# Patient Record
Sex: MALE | Race: WHITE | Employment: OTHER | URBAN - METROPOLITAN AREA
[De-identification: names, ages, dates, MRNs, and addresses within clinical notes are randomized per-mention and may not be internally consistent; named-entity substitution may affect disease eponyms.]

---

## 2021-01-01 ENCOUNTER — APPOINTMENT (OUTPATIENT)
Dept: CT IMAGING | Age: 81
DRG: 871 | End: 2021-01-01
Payer: MEDICARE

## 2021-01-01 ENCOUNTER — APPOINTMENT (OUTPATIENT)
Dept: GENERAL RADIOLOGY | Age: 81
DRG: 871 | End: 2021-01-01
Payer: MEDICARE

## 2021-01-01 ENCOUNTER — APPOINTMENT (OUTPATIENT)
Dept: ULTRASOUND IMAGING | Age: 81
DRG: 871 | End: 2021-01-01
Payer: MEDICARE

## 2021-01-01 ENCOUNTER — HOSPITAL ENCOUNTER (INPATIENT)
Age: 81
LOS: 5 days | Discharge: HOSPICE | DRG: 871 | End: 2021-09-25
Attending: EMERGENCY MEDICINE | Admitting: INTERNAL MEDICINE
Payer: MEDICARE

## 2021-01-01 VITALS
OXYGEN SATURATION: 95 % | HEIGHT: 66 IN | WEIGHT: 219.1 LBS | BODY MASS INDEX: 35.21 KG/M2 | HEART RATE: 89 BPM | TEMPERATURE: 99.3 F | DIASTOLIC BLOOD PRESSURE: 70 MMHG | SYSTOLIC BLOOD PRESSURE: 155 MMHG | RESPIRATION RATE: 24 BRPM

## 2021-01-01 DIAGNOSIS — J12.82 PNEUMONIA DUE TO COVID-19 VIRUS: Primary | ICD-10-CM

## 2021-01-01 DIAGNOSIS — U07.1 PNEUMONIA DUE TO COVID-19 VIRUS: Primary | ICD-10-CM

## 2021-01-01 DIAGNOSIS — J96.01 ACUTE RESPIRATORY FAILURE WITH HYPOXIA (HCC): ICD-10-CM

## 2021-01-01 DIAGNOSIS — I26.99 BILATERAL PULMONARY EMBOLISM (HCC): ICD-10-CM

## 2021-01-01 LAB
ACANTHOCYTES: ABNORMAL
ALBUMIN SERPL-MCNC: 2.7 G/DL (ref 3.5–5.2)
ALBUMIN SERPL-MCNC: 2.9 G/DL (ref 3.5–5.2)
ALBUMIN SERPL-MCNC: 2.9 G/DL (ref 3.5–5.2)
ALBUMIN SERPL-MCNC: 3.1 G/DL (ref 3.5–5.2)
ALBUMIN SERPL-MCNC: 3.3 G/DL (ref 3.5–5.2)
ALBUMIN SERPL-MCNC: 3.5 G/DL (ref 3.5–5.2)
ALP BLD-CCNC: 56 U/L (ref 40–129)
ALP BLD-CCNC: 58 U/L (ref 40–129)
ALP BLD-CCNC: 59 U/L (ref 40–129)
ALP BLD-CCNC: 64 U/L (ref 40–129)
ALP BLD-CCNC: 66 U/L (ref 40–129)
ALP BLD-CCNC: 70 U/L (ref 40–129)
ALT SERPL-CCNC: 28 U/L (ref 0–40)
ALT SERPL-CCNC: 31 U/L (ref 0–40)
ALT SERPL-CCNC: 33 U/L (ref 0–40)
ALT SERPL-CCNC: 36 U/L (ref 0–40)
ALT SERPL-CCNC: 37 U/L (ref 0–40)
ALT SERPL-CCNC: 45 U/L (ref 0–40)
ANION GAP SERPL CALCULATED.3IONS-SCNC: 10 MMOL/L (ref 7–16)
ANION GAP SERPL CALCULATED.3IONS-SCNC: 11 MMOL/L (ref 7–16)
ANION GAP SERPL CALCULATED.3IONS-SCNC: 13 MMOL/L (ref 7–16)
ANION GAP SERPL CALCULATED.3IONS-SCNC: 8 MMOL/L (ref 7–16)
APTT: >240 SEC (ref 24.5–35.1)
AST SERPL-CCNC: 33 U/L (ref 0–39)
AST SERPL-CCNC: 35 U/L (ref 0–39)
AST SERPL-CCNC: 40 U/L (ref 0–39)
AST SERPL-CCNC: 44 U/L (ref 0–39)
AST SERPL-CCNC: 51 U/L (ref 0–39)
AST SERPL-CCNC: 68 U/L (ref 0–39)
B.E.: -1.6 MMOL/L (ref -3–3)
BASOPHILS ABSOLUTE: 0 E9/L (ref 0–0.2)
BASOPHILS ABSOLUTE: 0.01 E9/L (ref 0–0.2)
BASOPHILS ABSOLUTE: 0.01 E9/L (ref 0–0.2)
BASOPHILS RELATIVE PERCENT: 0 % (ref 0–2)
BASOPHILS RELATIVE PERCENT: 0.2 % (ref 0–2)
BASOPHILS RELATIVE PERCENT: 0.2 % (ref 0–2)
BILIRUB SERPL-MCNC: 0.5 MG/DL (ref 0–1.2)
BILIRUB SERPL-MCNC: 0.5 MG/DL (ref 0–1.2)
BILIRUB SERPL-MCNC: 0.6 MG/DL (ref 0–1.2)
BILIRUB SERPL-MCNC: 0.6 MG/DL (ref 0–1.2)
BILIRUB SERPL-MCNC: 0.8 MG/DL (ref 0–1.2)
BILIRUB SERPL-MCNC: 0.9 MG/DL (ref 0–1.2)
BILIRUBIN DIRECT: 0.2 MG/DL (ref 0–0.3)
BILIRUBIN, INDIRECT: 0.3 MG/DL (ref 0–1)
BLOOD CULTURE, ROUTINE: NORMAL
BUN BLDV-MCNC: 18 MG/DL (ref 6–23)
BUN BLDV-MCNC: 19 MG/DL (ref 6–23)
BUN BLDV-MCNC: 21 MG/DL (ref 6–23)
BUN BLDV-MCNC: 21 MG/DL (ref 6–23)
BUN BLDV-MCNC: 26 MG/DL (ref 6–23)
BUN BLDV-MCNC: 33 MG/DL (ref 6–23)
BURR CELLS: ABNORMAL
C-REACTIVE PROTEIN: 1.8 MG/DL (ref 0–0.4)
C-REACTIVE PROTEIN: 2.1 MG/DL (ref 0–0.4)
C-REACTIVE PROTEIN: 2.4 MG/DL (ref 0–0.4)
C-REACTIVE PROTEIN: 2.4 MG/DL (ref 0–0.4)
C-REACTIVE PROTEIN: 3.8 MG/DL (ref 0–0.4)
CALCIUM SERPL-MCNC: 7.5 MG/DL (ref 8.6–10.2)
CALCIUM SERPL-MCNC: 7.5 MG/DL (ref 8.6–10.2)
CALCIUM SERPL-MCNC: 7.7 MG/DL (ref 8.6–10.2)
CALCIUM SERPL-MCNC: 7.7 MG/DL (ref 8.6–10.2)
CALCIUM SERPL-MCNC: 8.1 MG/DL (ref 8.6–10.2)
CALCIUM SERPL-MCNC: 8.4 MG/DL (ref 8.6–10.2)
CHLORIDE BLD-SCNC: 102 MMOL/L (ref 98–107)
CHLORIDE BLD-SCNC: 106 MMOL/L (ref 98–107)
CHLORIDE BLD-SCNC: 106 MMOL/L (ref 98–107)
CHLORIDE BLD-SCNC: 107 MMOL/L (ref 98–107)
CHLORIDE BLD-SCNC: 109 MMOL/L (ref 98–107)
CHLORIDE BLD-SCNC: 109 MMOL/L (ref 98–107)
CO2: 19 MMOL/L (ref 22–29)
CO2: 20 MMOL/L (ref 22–29)
CO2: 21 MMOL/L (ref 22–29)
CO2: 22 MMOL/L (ref 22–29)
CO2: 23 MMOL/L (ref 22–29)
CO2: 24 MMOL/L (ref 22–29)
COHB: 0.6 % (ref 0–1.5)
CREAT SERPL-MCNC: 0.8 MG/DL (ref 0.7–1.2)
CREAT SERPL-MCNC: 0.9 MG/DL (ref 0.7–1.2)
CREAT SERPL-MCNC: 1.1 MG/DL (ref 0.7–1.2)
CRITICAL: ABNORMAL
CULTURE, BLOOD 2: NORMAL
D DIMER: 1516 NG/ML DDU
D DIMER: 1612 NG/ML DDU
D DIMER: 1650 NG/ML DDU
D DIMER: 1958 NG/ML DDU
D DIMER: 865 NG/ML DDU
DATE ANALYZED: ABNORMAL
DATE OF COLLECTION: ABNORMAL
EKG ATRIAL RATE: 90 BPM
EKG P AXIS: 65 DEGREES
EKG P-R INTERVAL: 132 MS
EKG Q-T INTERVAL: 398 MS
EKG QRS DURATION: 98 MS
EKG QTC CALCULATION (BAZETT): 486 MS
EKG R AXIS: 16 DEGREES
EKG T AXIS: -16 DEGREES
EKG VENTRICULAR RATE: 90 BPM
EOSINOPHILS ABSOLUTE: 0 E9/L (ref 0.05–0.5)
EOSINOPHILS RELATIVE PERCENT: 0 % (ref 0–6)
FIBRINOGEN: 385 MG/DL (ref 225–540)
FIBRINOGEN: 398 MG/DL (ref 225–540)
FIBRINOGEN: 432 MG/DL (ref 225–540)
FIBRINOGEN: 473 MG/DL (ref 225–540)
FIBRINOGEN: 473 MG/DL (ref 225–540)
FIBRINOGEN: 544 MG/DL (ref 225–540)
GFR AFRICAN AMERICAN: >60
GFR NON-AFRICAN AMERICAN: >60 ML/MIN/1.73
GLUCOSE BLD-MCNC: 159 MG/DL (ref 74–99)
GLUCOSE BLD-MCNC: 170 MG/DL (ref 74–99)
GLUCOSE BLD-MCNC: 187 MG/DL (ref 74–99)
GLUCOSE BLD-MCNC: 195 MG/DL (ref 74–99)
GLUCOSE BLD-MCNC: 195 MG/DL (ref 74–99)
GLUCOSE BLD-MCNC: 203 MG/DL (ref 74–99)
HCO3: 20.4 MMOL/L (ref 22–26)
HCT VFR BLD CALC: 38.1 % (ref 37–54)
HCT VFR BLD CALC: 38.7 % (ref 37–54)
HCT VFR BLD CALC: 39.2 % (ref 37–54)
HCT VFR BLD CALC: 39.7 % (ref 37–54)
HCT VFR BLD CALC: 41.5 % (ref 37–54)
HCT VFR BLD CALC: 43.4 % (ref 37–54)
HCT VFR BLD CALC: 44 % (ref 37–54)
HEMOGLOBIN: 12.6 G/DL (ref 12.5–16.5)
HEMOGLOBIN: 12.6 G/DL (ref 12.5–16.5)
HEMOGLOBIN: 13 G/DL (ref 12.5–16.5)
HEMOGLOBIN: 13.1 G/DL (ref 12.5–16.5)
HEMOGLOBIN: 13.7 G/DL (ref 12.5–16.5)
HEMOGLOBIN: 14.2 G/DL (ref 12.5–16.5)
HEMOGLOBIN: 14.3 G/DL (ref 12.5–16.5)
HHB: 5.9 % (ref 0–5)
IMMATURE GRANULOCYTES #: 0.02 E9/L
IMMATURE GRANULOCYTES #: 0.03 E9/L
IMMATURE GRANULOCYTES #: 0.03 E9/L
IMMATURE GRANULOCYTES #: 0.04 E9/L
IMMATURE GRANULOCYTES #: 0.05 E9/L
IMMATURE GRANULOCYTES #: 0.08 E9/L
IMMATURE GRANULOCYTES %: 0.3 % (ref 0–5)
IMMATURE GRANULOCYTES %: 0.5 % (ref 0–5)
IMMATURE GRANULOCYTES %: 0.6 % (ref 0–5)
IMMATURE GRANULOCYTES %: 0.8 % (ref 0–5)
IMMATURE GRANULOCYTES %: 0.9 % (ref 0–5)
IMMATURE GRANULOCYTES %: 1.2 % (ref 0–5)
INR BLD: 1.2
INR BLD: 1.2
INR BLD: 1.6
INR BLD: 1.7
INR BLD: 1.8
INR BLD: 2
LAB: ABNORMAL
LACTIC ACID, SEPSIS: 2.1 MMOL/L (ref 0.5–1.9)
LACTIC ACID: 2.2 MMOL/L (ref 0.5–2.2)
LYMPHOCYTES ABSOLUTE: 0.5 E9/L (ref 1.5–4)
LYMPHOCYTES ABSOLUTE: 0.53 E9/L (ref 1.5–4)
LYMPHOCYTES ABSOLUTE: 0.57 E9/L (ref 1.5–4)
LYMPHOCYTES ABSOLUTE: 0.65 E9/L (ref 1.5–4)
LYMPHOCYTES ABSOLUTE: 0.71 E9/L (ref 1.5–4)
LYMPHOCYTES ABSOLUTE: 0.71 E9/L (ref 1.5–4)
LYMPHOCYTES RELATIVE PERCENT: 11 % (ref 20–42)
LYMPHOCYTES RELATIVE PERCENT: 11.6 % (ref 20–42)
LYMPHOCYTES RELATIVE PERCENT: 13.1 % (ref 20–42)
LYMPHOCYTES RELATIVE PERCENT: 13.3 % (ref 20–42)
LYMPHOCYTES RELATIVE PERCENT: 7.6 % (ref 20–42)
LYMPHOCYTES RELATIVE PERCENT: 9.2 % (ref 20–42)
Lab: ABNORMAL
MCH RBC QN AUTO: 30 PG (ref 26–35)
MCH RBC QN AUTO: 30 PG (ref 26–35)
MCH RBC QN AUTO: 30.3 PG (ref 26–35)
MCH RBC QN AUTO: 30.4 PG (ref 26–35)
MCH RBC QN AUTO: 30.6 PG (ref 26–35)
MCHC RBC AUTO-ENTMCNC: 32.3 % (ref 32–34.5)
MCHC RBC AUTO-ENTMCNC: 32.6 % (ref 32–34.5)
MCHC RBC AUTO-ENTMCNC: 32.9 % (ref 32–34.5)
MCHC RBC AUTO-ENTMCNC: 33 % (ref 32–34.5)
MCHC RBC AUTO-ENTMCNC: 33 % (ref 32–34.5)
MCHC RBC AUTO-ENTMCNC: 33.1 % (ref 32–34.5)
MCHC RBC AUTO-ENTMCNC: 33.2 % (ref 32–34.5)
MCV RBC AUTO: 91 FL (ref 80–99.9)
MCV RBC AUTO: 91.6 FL (ref 80–99.9)
MCV RBC AUTO: 92.5 FL (ref 80–99.9)
MCV RBC AUTO: 92.8 FL (ref 80–99.9)
MCV RBC AUTO: 92.8 FL (ref 80–99.9)
MCV RBC AUTO: 92.9 FL (ref 80–99.9)
MCV RBC AUTO: 93 FL (ref 80–99.9)
METER GLUCOSE: 121 MG/DL (ref 74–99)
METER GLUCOSE: 136 MG/DL (ref 74–99)
METER GLUCOSE: 149 MG/DL (ref 74–99)
METER GLUCOSE: 173 MG/DL (ref 74–99)
METER GLUCOSE: 175 MG/DL (ref 74–99)
METER GLUCOSE: 182 MG/DL (ref 74–99)
METER GLUCOSE: 187 MG/DL (ref 74–99)
METER GLUCOSE: 222 MG/DL (ref 74–99)
METHB: 0.1 % (ref 0–1.5)
MODE: ABNORMAL
MONOCYTES ABSOLUTE: 0.2 E9/L (ref 0.1–0.95)
MONOCYTES ABSOLUTE: 0.23 E9/L (ref 0.1–0.95)
MONOCYTES ABSOLUTE: 0.24 E9/L (ref 0.1–0.95)
MONOCYTES ABSOLUTE: 0.26 E9/L (ref 0.1–0.95)
MONOCYTES ABSOLUTE: 0.32 E9/L (ref 0.1–0.95)
MONOCYTES ABSOLUTE: 0.33 E9/L (ref 0.1–0.95)
MONOCYTES RELATIVE PERCENT: 3.5 % (ref 2–12)
MONOCYTES RELATIVE PERCENT: 3.8 % (ref 2–12)
MONOCYTES RELATIVE PERCENT: 4.4 % (ref 2–12)
MONOCYTES RELATIVE PERCENT: 5.2 % (ref 2–12)
MONOCYTES RELATIVE PERCENT: 5.3 % (ref 2–12)
MONOCYTES RELATIVE PERCENT: 5.7 % (ref 2–12)
NEUTROPHILS ABSOLUTE: 3.92 E9/L (ref 1.8–7.3)
NEUTROPHILS ABSOLUTE: 4.4 E9/L (ref 1.8–7.3)
NEUTROPHILS ABSOLUTE: 4.41 E9/L (ref 1.8–7.3)
NEUTROPHILS ABSOLUTE: 4.86 E9/L (ref 1.8–7.3)
NEUTROPHILS ABSOLUTE: 5.06 E9/L (ref 1.8–7.3)
NEUTROPHILS ABSOLUTE: 5.78 E9/L (ref 1.8–7.3)
NEUTROPHILS RELATIVE PERCENT: 80.6 % (ref 43–80)
NEUTROPHILS RELATIVE PERCENT: 81.6 % (ref 43–80)
NEUTROPHILS RELATIVE PERCENT: 82.7 % (ref 43–80)
NEUTROPHILS RELATIVE PERCENT: 84.6 % (ref 43–80)
NEUTROPHILS RELATIVE PERCENT: 84.6 % (ref 43–80)
NEUTROPHILS RELATIVE PERCENT: 87.5 % (ref 43–80)
O2 CONTENT: 17.6 ML/DL
O2 SATURATION: 94.1 % (ref 92–98.5)
O2HB: 93.4 % (ref 94–97)
OPERATOR ID: 7296
OVALOCYTES: ABNORMAL
OVALOCYTES: ABNORMAL
PATIENT TEMP: 37 C
PCO2: 27.3 MMHG (ref 35–45)
PDW BLD-RTO: 14.4 FL (ref 11.5–15)
PDW BLD-RTO: 14.4 FL (ref 11.5–15)
PDW BLD-RTO: 14.6 FL (ref 11.5–15)
PDW BLD-RTO: 14.8 FL (ref 11.5–15)
PDW BLD-RTO: 15 FL (ref 11.5–15)
PH BLOOD GAS: 7.49 (ref 7.35–7.45)
PLATELET # BLD: 105 E9/L (ref 130–450)
PLATELET # BLD: 107 E9/L (ref 130–450)
PLATELET # BLD: 113 E9/L (ref 130–450)
PLATELET # BLD: 118 E9/L (ref 130–450)
PLATELET # BLD: 134 E9/L (ref 130–450)
PLATELET # BLD: 152 E9/L (ref 130–450)
PLATELET # BLD: 178 E9/L (ref 130–450)
PMV BLD AUTO: 10.4 FL (ref 7–12)
PMV BLD AUTO: 10.5 FL (ref 7–12)
PMV BLD AUTO: 10.5 FL (ref 7–12)
PMV BLD AUTO: 10.7 FL (ref 7–12)
PMV BLD AUTO: 9.4 FL (ref 7–12)
PMV BLD AUTO: 9.6 FL (ref 7–12)
PMV BLD AUTO: 9.7 FL (ref 7–12)
PO2: 68.1 MMHG (ref 75–100)
POIKILOCYTES: ABNORMAL
POLYCHROMASIA: ABNORMAL
POTASSIUM REFLEX MAGNESIUM: 4 MMOL/L (ref 3.5–5)
POTASSIUM REFLEX MAGNESIUM: 4.2 MMOL/L (ref 3.5–5)
POTASSIUM REFLEX MAGNESIUM: 4.3 MMOL/L (ref 3.5–5)
POTASSIUM REFLEX MAGNESIUM: 4.4 MMOL/L (ref 3.5–5)
POTASSIUM REFLEX MAGNESIUM: 4.4 MMOL/L (ref 3.5–5)
POTASSIUM REFLEX MAGNESIUM: 4.6 MMOL/L (ref 3.5–5)
PRO-BNP: 300 PG/ML (ref 0–450)
PROCALCITONIN: 0.02 NG/ML (ref 0–0.08)
PROCALCITONIN: 0.08 NG/ML (ref 0–0.08)
PROTHROMBIN TIME: 12.8 SEC (ref 9.3–12.4)
PROTHROMBIN TIME: 13.6 SEC (ref 9.3–12.4)
PROTHROMBIN TIME: 17.7 SEC (ref 9.3–12.4)
PROTHROMBIN TIME: 18.9 SEC (ref 9.3–12.4)
PROTHROMBIN TIME: 19.4 SEC (ref 9.3–12.4)
PROTHROMBIN TIME: 22.3 SEC (ref 9.3–12.4)
RBC # BLD: 4.12 E12/L (ref 3.8–5.8)
RBC # BLD: 4.16 E12/L (ref 3.8–5.8)
RBC # BLD: 4.28 E12/L (ref 3.8–5.8)
RBC # BLD: 4.28 E12/L (ref 3.8–5.8)
RBC # BLD: 4.56 E12/L (ref 3.8–5.8)
RBC # BLD: 4.67 E12/L (ref 3.8–5.8)
RBC # BLD: 4.74 E12/L (ref 3.8–5.8)
REASON FOR REJECTION: NORMAL
REJECTED TEST: NORMAL
SARS-COV-2: DETECTED
SCHISTOCYTES: ABNORMAL
SEDIMENTATION RATE, ERYTHROCYTE: 12 MM/HR (ref 0–15)
SEDIMENTATION RATE, ERYTHROCYTE: 6 MM/HR (ref 0–15)
SODIUM BLD-SCNC: 136 MMOL/L (ref 132–146)
SODIUM BLD-SCNC: 139 MMOL/L (ref 132–146)
SODIUM BLD-SCNC: 140 MMOL/L (ref 132–146)
SODIUM BLD-SCNC: 143 MMOL/L (ref 132–146)
SOURCE, BLOOD GAS: ABNORMAL
THB: 13.4 G/DL (ref 11.5–16.5)
TIME ANALYZED: 1430
TOTAL PROTEIN: 5.2 G/DL (ref 6.4–8.3)
TOTAL PROTEIN: 5.5 G/DL (ref 6.4–8.3)
TOTAL PROTEIN: 5.6 G/DL (ref 6.4–8.3)
TOTAL PROTEIN: 5.7 G/DL (ref 6.4–8.3)
TOTAL PROTEIN: 6.1 G/DL (ref 6.4–8.3)
TOTAL PROTEIN: 6.3 G/DL (ref 6.4–8.3)
TROPONIN, HIGH SENSITIVITY: 25 NG/L (ref 0–11)
WBC # BLD: 4.5 E9/L (ref 4.5–11.5)
WBC # BLD: 4.9 E9/L (ref 4.5–11.5)
WBC # BLD: 5.2 E9/L (ref 4.5–11.5)
WBC # BLD: 5.4 E9/L (ref 4.5–11.5)
WBC # BLD: 5.8 E9/L (ref 4.5–11.5)
WBC # BLD: 6.1 E9/L (ref 4.5–11.5)
WBC # BLD: 6.6 E9/L (ref 4.5–11.5)

## 2021-01-01 PROCEDURE — 6370000000 HC RX 637 (ALT 250 FOR IP): Performed by: INTERNAL MEDICINE

## 2021-01-01 PROCEDURE — 80053 COMPREHEN METABOLIC PANEL: CPT

## 2021-01-01 PROCEDURE — 6370000000 HC RX 637 (ALT 250 FOR IP): Performed by: NURSE PRACTITIONER

## 2021-01-01 PROCEDURE — 6360000002 HC RX W HCPCS: Performed by: STUDENT IN AN ORGANIZED HEALTH CARE EDUCATION/TRAINING PROGRAM

## 2021-01-01 PROCEDURE — 86140 C-REACTIVE PROTEIN: CPT

## 2021-01-01 PROCEDURE — 85610 PROTHROMBIN TIME: CPT

## 2021-01-01 PROCEDURE — 97530 THERAPEUTIC ACTIVITIES: CPT

## 2021-01-01 PROCEDURE — 71275 CT ANGIOGRAPHY CHEST: CPT

## 2021-01-01 PROCEDURE — 85384 FIBRINOGEN ACTIVITY: CPT

## 2021-01-01 PROCEDURE — 6360000002 HC RX W HCPCS: Performed by: INTERNAL MEDICINE

## 2021-01-01 PROCEDURE — 74230 X-RAY XM SWLNG FUNCJ C+: CPT

## 2021-01-01 PROCEDURE — 2060000000 HC ICU INTERMEDIATE R&B

## 2021-01-01 PROCEDURE — 99283 EMERGENCY DEPT VISIT LOW MDM: CPT

## 2021-01-01 PROCEDURE — 85378 FIBRIN DEGRADE SEMIQUANT: CPT

## 2021-01-01 PROCEDURE — 85027 COMPLETE CBC AUTOMATED: CPT

## 2021-01-01 PROCEDURE — 36600 WITHDRAWAL OF ARTERIAL BLOOD: CPT

## 2021-01-01 PROCEDURE — 93970 EXTREMITY STUDY: CPT

## 2021-01-01 PROCEDURE — 2700000000 HC OXYGEN THERAPY PER DAY

## 2021-01-01 PROCEDURE — 84145 PROCALCITONIN (PCT): CPT

## 2021-01-01 PROCEDURE — 87040 BLOOD CULTURE FOR BACTERIA: CPT

## 2021-01-01 PROCEDURE — 80076 HEPATIC FUNCTION PANEL: CPT

## 2021-01-01 PROCEDURE — 6360000004 HC RX CONTRAST MEDICATION: Performed by: RADIOLOGY

## 2021-01-01 PROCEDURE — 2580000003 HC RX 258: Performed by: STUDENT IN AN ORGANIZED HEALTH CARE EDUCATION/TRAINING PROGRAM

## 2021-01-01 PROCEDURE — 85025 COMPLETE CBC W/AUTO DIFF WBC: CPT

## 2021-01-01 PROCEDURE — 84484 ASSAY OF TROPONIN QUANT: CPT

## 2021-01-01 PROCEDURE — 93005 ELECTROCARDIOGRAM TRACING: CPT | Performed by: STUDENT IN AN ORGANIZED HEALTH CARE EDUCATION/TRAINING PROGRAM

## 2021-01-01 PROCEDURE — 83880 ASSAY OF NATRIURETIC PEPTIDE: CPT

## 2021-01-01 PROCEDURE — 36415 COLL VENOUS BLD VENIPUNCTURE: CPT

## 2021-01-01 PROCEDURE — 83605 ASSAY OF LACTIC ACID: CPT

## 2021-01-01 PROCEDURE — 2580000003 HC RX 258: Performed by: INTERNAL MEDICINE

## 2021-01-01 PROCEDURE — 97161 PT EVAL LOW COMPLEX 20 MIN: CPT

## 2021-01-01 PROCEDURE — 82962 GLUCOSE BLOOD TEST: CPT

## 2021-01-01 PROCEDURE — 97165 OT EVAL LOW COMPLEX 30 MIN: CPT

## 2021-01-01 PROCEDURE — 51702 INSERT TEMP BLADDER CATH: CPT

## 2021-01-01 PROCEDURE — 6370000000 HC RX 637 (ALT 250 FOR IP): Performed by: STUDENT IN AN ORGANIZED HEALTH CARE EDUCATION/TRAINING PROGRAM

## 2021-01-01 PROCEDURE — 71045 X-RAY EXAM CHEST 1 VIEW: CPT

## 2021-01-01 PROCEDURE — 85651 RBC SED RATE NONAUTOMATED: CPT

## 2021-01-01 PROCEDURE — 80048 BASIC METABOLIC PNL TOTAL CA: CPT

## 2021-01-01 PROCEDURE — 82805 BLOOD GASES W/O2 SATURATION: CPT

## 2021-01-01 PROCEDURE — 92610 EVALUATE SWALLOWING FUNCTION: CPT | Performed by: SPEECH-LANGUAGE PATHOLOGIST

## 2021-01-01 PROCEDURE — 6360000002 HC RX W HCPCS: Performed by: EMERGENCY MEDICINE

## 2021-01-01 PROCEDURE — 92611 MOTION FLUOROSCOPY/SWALLOW: CPT | Performed by: SPEECH-LANGUAGE PATHOLOGIST

## 2021-01-01 PROCEDURE — 96361 HYDRATE IV INFUSION ADD-ON: CPT

## 2021-01-01 PROCEDURE — 92526 ORAL FUNCTION THERAPY: CPT | Performed by: SPEECH-LANGUAGE PATHOLOGIST

## 2021-01-01 PROCEDURE — 96374 THER/PROPH/DIAG INJ IV PUSH: CPT

## 2021-01-01 PROCEDURE — 85730 THROMBOPLASTIN TIME PARTIAL: CPT

## 2021-01-01 RX ORDER — SODIUM CHLORIDE 9 MG/ML
25 INJECTION, SOLUTION INTRAVENOUS PRN
Status: DISCONTINUED | OUTPATIENT
Start: 2021-01-01 | End: 2021-01-01 | Stop reason: HOSPADM

## 2021-01-01 RX ORDER — GUAIFENESIN/DEXTROMETHORPHAN 100-10MG/5
5 SYRUP ORAL EVERY 4 HOURS PRN
Status: DISCONTINUED | OUTPATIENT
Start: 2021-01-01 | End: 2021-01-01 | Stop reason: HOSPADM

## 2021-01-01 RX ORDER — MORPHINE SULFATE 2 MG/ML
2 INJECTION, SOLUTION INTRAMUSCULAR; INTRAVENOUS
Status: DISCONTINUED | OUTPATIENT
Start: 2021-01-01 | End: 2021-01-01 | Stop reason: HOSPADM

## 2021-01-01 RX ORDER — DEXAMETHASONE SODIUM PHOSPHATE 4 MG/ML
10 INJECTION, SOLUTION INTRA-ARTICULAR; INTRALESIONAL; INTRAMUSCULAR; INTRAVENOUS; SOFT TISSUE EVERY 24 HOURS
Status: DISCONTINUED | OUTPATIENT
Start: 2021-01-01 | End: 2021-01-01 | Stop reason: HOSPADM

## 2021-01-01 RX ORDER — ACETAMINOPHEN 325 MG/1
650 TABLET ORAL EVERY 6 HOURS PRN
Status: DISCONTINUED | OUTPATIENT
Start: 2021-01-01 | End: 2021-01-01 | Stop reason: HOSPADM

## 2021-01-01 RX ORDER — ONDANSETRON 4 MG/1
4 TABLET, ORALLY DISINTEGRATING ORAL EVERY 8 HOURS PRN
Status: DISCONTINUED | OUTPATIENT
Start: 2021-01-01 | End: 2021-01-01 | Stop reason: HOSPADM

## 2021-01-01 RX ORDER — ROSUVASTATIN CALCIUM 20 MG/1
20 TABLET, COATED ORAL NIGHTLY
Status: DISCONTINUED | OUTPATIENT
Start: 2021-01-01 | End: 2021-01-01 | Stop reason: HOSPADM

## 2021-01-01 RX ORDER — ALBUTEROL SULFATE 90 UG/1
2 AEROSOL, METERED RESPIRATORY (INHALATION) 4 TIMES DAILY
Status: DISCONTINUED | OUTPATIENT
Start: 2021-01-01 | End: 2021-01-01 | Stop reason: HOSPADM

## 2021-01-01 RX ORDER — PANTOPRAZOLE SODIUM 40 MG/1
40 TABLET, DELAYED RELEASE ORAL
Status: DISCONTINUED | OUTPATIENT
Start: 2021-01-01 | End: 2021-01-01 | Stop reason: HOSPADM

## 2021-01-01 RX ORDER — ASCORBIC ACID 500 MG
500 TABLET ORAL DAILY
Status: DISCONTINUED | OUTPATIENT
Start: 2021-01-01 | End: 2021-01-01

## 2021-01-01 RX ORDER — ASPIRIN 81 MG/1
81 TABLET, CHEWABLE ORAL NIGHTLY
Status: DISCONTINUED | OUTPATIENT
Start: 2021-01-01 | End: 2021-01-01 | Stop reason: HOSPADM

## 2021-01-01 RX ORDER — SODIUM CHLORIDE 0.9 % (FLUSH) 0.9 %
10 SYRINGE (ML) INJECTION PRN
Status: DISCONTINUED | OUTPATIENT
Start: 2021-01-01 | End: 2021-01-01 | Stop reason: HOSPADM

## 2021-01-01 RX ORDER — ROSUVASTATIN CALCIUM 20 MG/1
20 TABLET, COATED ORAL NIGHTLY
COMMUNITY
Start: 2021-01-01

## 2021-01-01 RX ORDER — HEPARIN SODIUM 10000 [USP'U]/100ML
5-30 INJECTION, SOLUTION INTRAVENOUS CONTINUOUS
Status: DISCONTINUED | OUTPATIENT
Start: 2021-01-01 | End: 2021-01-01

## 2021-01-01 RX ORDER — LANOLIN ALCOHOL/MO/W.PET/CERES
100 CREAM (GRAM) TOPICAL DAILY
Status: DISCONTINUED | OUTPATIENT
Start: 2021-01-01 | End: 2021-01-01 | Stop reason: HOSPADM

## 2021-01-01 RX ORDER — DEXTROSE MONOHYDRATE 50 MG/ML
100 INJECTION, SOLUTION INTRAVENOUS PRN
Status: DISCONTINUED | OUTPATIENT
Start: 2021-01-01 | End: 2021-01-01 | Stop reason: HOSPADM

## 2021-01-01 RX ORDER — ONDANSETRON 2 MG/ML
4 INJECTION INTRAMUSCULAR; INTRAVENOUS EVERY 6 HOURS PRN
Status: DISCONTINUED | OUTPATIENT
Start: 2021-01-01 | End: 2021-01-01 | Stop reason: HOSPADM

## 2021-01-01 RX ORDER — HEPARIN SODIUM 1000 [USP'U]/ML
80 INJECTION, SOLUTION INTRAVENOUS; SUBCUTANEOUS ONCE
Status: COMPLETED | OUTPATIENT
Start: 2021-01-01 | End: 2021-01-01

## 2021-01-01 RX ORDER — HEPARIN SODIUM 1000 [USP'U]/ML
80 INJECTION, SOLUTION INTRAVENOUS; SUBCUTANEOUS PRN
Status: DISCONTINUED | OUTPATIENT
Start: 2021-01-01 | End: 2021-01-01 | Stop reason: HOSPADM

## 2021-01-01 RX ORDER — ZINC SULFATE 50(220)MG
50 CAPSULE ORAL DAILY
Status: DISCONTINUED | OUTPATIENT
Start: 2021-01-01 | End: 2021-01-01 | Stop reason: HOSPADM

## 2021-01-01 RX ORDER — ASPIRIN 81 MG/1
81 TABLET, CHEWABLE ORAL NIGHTLY
COMMUNITY

## 2021-01-01 RX ORDER — NICOTINE POLACRILEX 4 MG
15 LOZENGE BUCCAL PRN
Status: DISCONTINUED | OUTPATIENT
Start: 2021-01-01 | End: 2021-01-01 | Stop reason: HOSPADM

## 2021-01-01 RX ORDER — SODIUM CHLORIDE 9 MG/ML
INJECTION, SOLUTION INTRAVENOUS CONTINUOUS
Status: DISCONTINUED | OUTPATIENT
Start: 2021-01-01 | End: 2021-01-01

## 2021-01-01 RX ORDER — HALOPERIDOL 5 MG/ML
1 INJECTION INTRAMUSCULAR EVERY 6 HOURS PRN
Status: DISCONTINUED | OUTPATIENT
Start: 2021-01-01 | End: 2021-01-01 | Stop reason: HOSPADM

## 2021-01-01 RX ORDER — ASCORBIC ACID 500 MG
1000 TABLET ORAL DAILY
Status: DISCONTINUED | OUTPATIENT
Start: 2021-01-01 | End: 2021-01-01 | Stop reason: HOSPADM

## 2021-01-01 RX ORDER — ACETAMINOPHEN 650 MG/1
650 SUPPOSITORY RECTAL EVERY 6 HOURS PRN
Status: DISCONTINUED | OUTPATIENT
Start: 2021-01-01 | End: 2021-01-01 | Stop reason: HOSPADM

## 2021-01-01 RX ORDER — DEXTROSE MONOHYDRATE 25 G/50ML
12.5 INJECTION, SOLUTION INTRAVENOUS PRN
Status: DISCONTINUED | OUTPATIENT
Start: 2021-01-01 | End: 2021-01-01 | Stop reason: HOSPADM

## 2021-01-01 RX ORDER — POTASSIUM CHLORIDE 20 MEQ/1
40 TABLET, EXTENDED RELEASE ORAL PRN
Status: DISCONTINUED | OUTPATIENT
Start: 2021-01-01 | End: 2021-01-01 | Stop reason: HOSPADM

## 2021-01-01 RX ORDER — VITAMIN B COMPLEX
2000 TABLET ORAL DAILY
Status: DISCONTINUED | OUTPATIENT
Start: 2021-01-01 | End: 2021-01-01 | Stop reason: HOSPADM

## 2021-01-01 RX ORDER — DEXAMETHASONE SODIUM PHOSPHATE 10 MG/ML
10 INJECTION, SOLUTION INTRAMUSCULAR; INTRAVENOUS ONCE
Status: COMPLETED | OUTPATIENT
Start: 2021-01-01 | End: 2021-01-01

## 2021-01-01 RX ORDER — HEPARIN SODIUM 1000 [USP'U]/ML
40 INJECTION, SOLUTION INTRAVENOUS; SUBCUTANEOUS PRN
Status: DISCONTINUED | OUTPATIENT
Start: 2021-01-01 | End: 2021-01-01 | Stop reason: HOSPADM

## 2021-01-01 RX ORDER — DEXAMETHASONE 4 MG/1
4 TABLET ORAL
COMMUNITY
Start: 2021-01-01 | End: 2021-01-01

## 2021-01-01 RX ORDER — POTASSIUM CHLORIDE 7.45 MG/ML
10 INJECTION INTRAVENOUS PRN
Status: DISCONTINUED | OUTPATIENT
Start: 2021-01-01 | End: 2021-01-01 | Stop reason: HOSPADM

## 2021-01-01 RX ORDER — HYDROXYZINE PAMOATE 25 MG/1
25 CAPSULE ORAL 4 TIMES DAILY PRN
Status: DISCONTINUED | OUTPATIENT
Start: 2021-01-01 | End: 2021-01-01 | Stop reason: HOSPADM

## 2021-01-01 RX ORDER — SENNA PLUS 8.6 MG/1
1 TABLET ORAL DAILY PRN
Status: DISCONTINUED | OUTPATIENT
Start: 2021-01-01 | End: 2021-01-01 | Stop reason: HOSPADM

## 2021-01-01 RX ORDER — SODIUM CHLORIDE 0.9 % (FLUSH) 0.9 %
10 SYRINGE (ML) INJECTION EVERY 12 HOURS SCHEDULED
Status: DISCONTINUED | OUTPATIENT
Start: 2021-01-01 | End: 2021-01-01 | Stop reason: HOSPADM

## 2021-01-01 RX ORDER — 0.9 % SODIUM CHLORIDE 0.9 %
1000 INTRAVENOUS SOLUTION INTRAVENOUS ONCE
Status: COMPLETED | OUTPATIENT
Start: 2021-01-01 | End: 2021-01-01

## 2021-01-01 RX ADMIN — APIXABAN 10 MG: 5 TABLET, FILM COATED ORAL at 22:56

## 2021-01-01 RX ADMIN — Medication 2000 UNITS: at 11:00

## 2021-01-01 RX ADMIN — SODIUM CHLORIDE, PRESERVATIVE FREE 10 ML: 5 INJECTION INTRAVENOUS at 20:27

## 2021-01-01 RX ADMIN — SODIUM CHLORIDE, PRESERVATIVE FREE 10 ML: 5 INJECTION INTRAVENOUS at 22:56

## 2021-01-01 RX ADMIN — BARICITINIB 4 MG: 2 TABLET, FILM COATED ORAL at 16:52

## 2021-01-01 RX ADMIN — Medication 2000 UNITS: at 09:16

## 2021-01-01 RX ADMIN — SODIUM CHLORIDE 1000 ML: 9 INJECTION, SOLUTION INTRAVENOUS at 14:34

## 2021-01-01 RX ADMIN — SODIUM CHLORIDE: 9 INJECTION, SOLUTION INTRAVENOUS at 01:40

## 2021-01-01 RX ADMIN — SODIUM CHLORIDE, PRESERVATIVE FREE 10 ML: 5 INJECTION INTRAVENOUS at 08:26

## 2021-01-01 RX ADMIN — APIXABAN 10 MG: 5 TABLET, FILM COATED ORAL at 08:28

## 2021-01-01 RX ADMIN — HEPARIN SODIUM 15 UNITS/KG/HR: 10000 INJECTION, SOLUTION INTRAVENOUS at 10:59

## 2021-01-01 RX ADMIN — METOPROLOL TARTRATE 25 MG: 25 TABLET, FILM COATED ORAL at 08:29

## 2021-01-01 RX ADMIN — OXYCODONE HYDROCHLORIDE AND ACETAMINOPHEN 500 MG: 500 TABLET ORAL at 08:46

## 2021-01-01 RX ADMIN — BARICITINIB 4 MG: 2 TABLET, FILM COATED ORAL at 08:28

## 2021-01-01 RX ADMIN — ROSUVASTATIN CALCIUM 20 MG: 20 TABLET, FILM COATED ORAL at 22:56

## 2021-01-01 RX ADMIN — AMPICILLIN AND SULBACTAM 3000 MG: 1; 2 INJECTION, POWDER, FOR SOLUTION INTRAMUSCULAR; INTRAVENOUS at 11:28

## 2021-01-01 RX ADMIN — DEXAMETHASONE SODIUM PHOSPHATE 10 MG: 4 INJECTION, SOLUTION INTRAMUSCULAR; INTRAVENOUS at 14:11

## 2021-01-01 RX ADMIN — ASPIRIN 81 MG CHEWABLE TABLET 81 MG: 81 TABLET CHEWABLE at 21:33

## 2021-01-01 RX ADMIN — OXYCODONE HYDROCHLORIDE AND ACETAMINOPHEN 500 MG: 500 TABLET ORAL at 08:57

## 2021-01-01 RX ADMIN — ZINC SULFATE 220 MG (50 MG) CAPSULE 50 MG: CAPSULE at 09:16

## 2021-01-01 RX ADMIN — INSULIN LISPRO 1 UNITS: 100 INJECTION, SOLUTION INTRAVENOUS; SUBCUTANEOUS at 20:48

## 2021-01-01 RX ADMIN — INSULIN LISPRO 1 UNITS: 100 INJECTION, SOLUTION INTRAVENOUS; SUBCUTANEOUS at 06:56

## 2021-01-01 RX ADMIN — DEXAMETHASONE 6 MG: 4 TABLET ORAL at 21:33

## 2021-01-01 RX ADMIN — INSULIN LISPRO 1 UNITS: 100 INJECTION, SOLUTION INTRAVENOUS; SUBCUTANEOUS at 08:30

## 2021-01-01 RX ADMIN — Medication 2000 UNITS: at 08:28

## 2021-01-01 RX ADMIN — METOPROLOL TARTRATE 25 MG: 25 TABLET, FILM COATED ORAL at 21:34

## 2021-01-01 RX ADMIN — DEXAMETHASONE SODIUM PHOSPHATE 10 MG: 4 INJECTION, SOLUTION INTRAMUSCULAR; INTRAVENOUS at 12:51

## 2021-01-01 RX ADMIN — HEPARIN SODIUM 7980 UNITS: 1000 INJECTION, SOLUTION INTRAVENOUS; SUBCUTANEOUS at 19:39

## 2021-01-01 RX ADMIN — APIXABAN 10 MG: 5 TABLET, FILM COATED ORAL at 20:26

## 2021-01-01 RX ADMIN — METOPROLOL TARTRATE 25 MG: 25 TABLET, FILM COATED ORAL at 08:47

## 2021-01-01 RX ADMIN — AMPICILLIN AND SULBACTAM 3000 MG: 1; 2 INJECTION, POWDER, FOR SOLUTION INTRAMUSCULAR; INTRAVENOUS at 06:16

## 2021-01-01 RX ADMIN — AMPICILLIN AND SULBACTAM 3000 MG: 1; 2 INJECTION, POWDER, FOR SOLUTION INTRAMUSCULAR; INTRAVENOUS at 11:23

## 2021-01-01 RX ADMIN — ZINC SULFATE 220 MG (50 MG) CAPSULE 50 MG: CAPSULE at 08:46

## 2021-01-01 RX ADMIN — APIXABAN 10 MG: 5 TABLET, FILM COATED ORAL at 08:57

## 2021-01-01 RX ADMIN — OXYCODONE HYDROCHLORIDE AND ACETAMINOPHEN 1000 MG: 500 TABLET ORAL at 08:28

## 2021-01-01 RX ADMIN — SODIUM CHLORIDE, PRESERVATIVE FREE 10 ML: 5 INJECTION INTRAVENOUS at 10:55

## 2021-01-01 RX ADMIN — PANTOPRAZOLE SODIUM 40 MG: 40 TABLET, DELAYED RELEASE ORAL at 06:49

## 2021-01-01 RX ADMIN — APIXABAN 10 MG: 5 TABLET, FILM COATED ORAL at 21:33

## 2021-01-01 RX ADMIN — SODIUM CHLORIDE, PRESERVATIVE FREE 10 ML: 5 INJECTION INTRAVENOUS at 08:57

## 2021-01-01 RX ADMIN — APIXABAN 10 MG: 5 TABLET, FILM COATED ORAL at 14:55

## 2021-01-01 RX ADMIN — AMPICILLIN AND SULBACTAM 3000 MG: 1; 2 INJECTION, POWDER, FOR SOLUTION INTRAMUSCULAR; INTRAVENOUS at 18:24

## 2021-01-01 RX ADMIN — ZINC SULFATE 220 MG (50 MG) CAPSULE 50 MG: CAPSULE at 08:57

## 2021-01-01 RX ADMIN — Medication 100 MG: at 09:16

## 2021-01-01 RX ADMIN — AMPICILLIN AND SULBACTAM 3000 MG: 1; 2 INJECTION, POWDER, FOR SOLUTION INTRAMUSCULAR; INTRAVENOUS at 17:35

## 2021-01-01 RX ADMIN — MORPHINE SULFATE 2 MG: 2 INJECTION, SOLUTION INTRAMUSCULAR; INTRAVENOUS at 16:11

## 2021-01-01 RX ADMIN — AMPICILLIN AND SULBACTAM 3000 MG: 1; 2 INJECTION, POWDER, FOR SOLUTION INTRAMUSCULAR; INTRAVENOUS at 22:56

## 2021-01-01 RX ADMIN — ZINC SULFATE 220 MG (50 MG) CAPSULE 50 MG: CAPSULE at 08:28

## 2021-01-01 RX ADMIN — ALBUTEROL SULFATE 2 PUFF: 90 AEROSOL, METERED RESPIRATORY (INHALATION) at 15:12

## 2021-01-01 RX ADMIN — OXYCODONE HYDROCHLORIDE AND ACETAMINOPHEN 1000 MG: 500 TABLET ORAL at 09:16

## 2021-01-01 RX ADMIN — INSULIN LISPRO 2 UNITS: 100 INJECTION, SOLUTION INTRAVENOUS; SUBCUTANEOUS at 16:48

## 2021-01-01 RX ADMIN — DEXAMETHASONE 6 MG: 4 TABLET ORAL at 01:41

## 2021-01-01 RX ADMIN — Medication 100 MG: at 08:26

## 2021-01-01 RX ADMIN — DEXAMETHASONE SODIUM PHOSPHATE 10 MG: 10 INJECTION, SOLUTION INTRAMUSCULAR; INTRAVENOUS at 14:37

## 2021-01-01 RX ADMIN — IOPAMIDOL 75 ML: 755 INJECTION, SOLUTION INTRAVENOUS at 15:56

## 2021-01-01 RX ADMIN — Medication 2000 UNITS: at 08:57

## 2021-01-01 RX ADMIN — ALBUTEROL SULFATE 2 PUFF: 90 AEROSOL, METERED RESPIRATORY (INHALATION) at 22:57

## 2021-01-01 RX ADMIN — SODIUM CHLORIDE: 9 INJECTION, SOLUTION INTRAVENOUS at 21:30

## 2021-01-01 RX ADMIN — ALBUTEROL SULFATE 2 PUFF: 90 AEROSOL, METERED RESPIRATORY (INHALATION) at 10:53

## 2021-01-01 RX ADMIN — AMPICILLIN AND SULBACTAM 3000 MG: 1; 2 INJECTION, POWDER, FOR SOLUTION INTRAMUSCULAR; INTRAVENOUS at 06:49

## 2021-01-01 RX ADMIN — SODIUM CHLORIDE, PRESERVATIVE FREE 10 ML: 5 INJECTION INTRAVENOUS at 08:47

## 2021-01-01 RX ADMIN — PANTOPRAZOLE SODIUM 40 MG: 40 TABLET, DELAYED RELEASE ORAL at 06:18

## 2021-01-01 RX ADMIN — METOPROLOL TARTRATE 25 MG: 25 TABLET, FILM COATED ORAL at 22:56

## 2021-01-01 RX ADMIN — SODIUM CHLORIDE: 9 INJECTION, SOLUTION INTRAVENOUS at 02:42

## 2021-01-01 RX ADMIN — DEXAMETHASONE SODIUM PHOSPHATE 10 MG: 4 INJECTION, SOLUTION INTRAMUSCULAR; INTRAVENOUS at 13:55

## 2021-01-01 RX ADMIN — ACETAMINOPHEN 650 MG: 325 TABLET ORAL at 20:26

## 2021-01-01 RX ADMIN — ASPIRIN 81 MG CHEWABLE TABLET 81 MG: 81 TABLET CHEWABLE at 22:56

## 2021-01-01 RX ADMIN — HYDROXYZINE PAMOATE 25 MG: 25 CAPSULE ORAL at 11:00

## 2021-01-01 RX ADMIN — METOPROLOL TARTRATE 25 MG: 25 TABLET, FILM COATED ORAL at 20:26

## 2021-01-01 RX ADMIN — APIXABAN 10 MG: 5 TABLET, FILM COATED ORAL at 21:31

## 2021-01-01 RX ADMIN — INSULIN LISPRO 1 UNITS: 100 INJECTION, SOLUTION INTRAVENOUS; SUBCUTANEOUS at 22:54

## 2021-01-01 RX ADMIN — ALBUTEROL SULFATE 2 PUFF: 90 AEROSOL, METERED RESPIRATORY (INHALATION) at 16:48

## 2021-01-01 RX ADMIN — ASPIRIN 81 MG CHEWABLE TABLET 81 MG: 81 TABLET CHEWABLE at 20:26

## 2021-01-01 RX ADMIN — APIXABAN 10 MG: 5 TABLET, FILM COATED ORAL at 10:54

## 2021-01-01 RX ADMIN — PANTOPRAZOLE SODIUM 40 MG: 40 TABLET, DELAYED RELEASE ORAL at 05:12

## 2021-01-01 RX ADMIN — Medication 100 MG: at 08:47

## 2021-01-01 RX ADMIN — OXYCODONE HYDROCHLORIDE AND ACETAMINOPHEN 1000 MG: 500 TABLET ORAL at 10:54

## 2021-01-01 RX ADMIN — DEXAMETHASONE SODIUM PHOSPHATE 10 MG: 4 INJECTION, SOLUTION INTRAMUSCULAR; INTRAVENOUS at 15:10

## 2021-01-01 RX ADMIN — ROSUVASTATIN CALCIUM 20 MG: 20 TABLET, FILM COATED ORAL at 21:32

## 2021-01-01 RX ADMIN — Medication 100 MG: at 08:57

## 2021-01-01 RX ADMIN — AMPICILLIN AND SULBACTAM 3000 MG: 1; 2 INJECTION, POWDER, FOR SOLUTION INTRAMUSCULAR; INTRAVENOUS at 23:38

## 2021-01-01 RX ADMIN — BARICITINIB 4 MG: 2 TABLET, FILM COATED ORAL at 09:16

## 2021-01-01 RX ADMIN — Medication 2000 UNITS: at 08:46

## 2021-01-01 RX ADMIN — PANTOPRAZOLE SODIUM 40 MG: 40 TABLET, DELAYED RELEASE ORAL at 06:12

## 2021-01-01 RX ADMIN — ROSUVASTATIN CALCIUM 20 MG: 20 TABLET, FILM COATED ORAL at 20:26

## 2021-01-01 RX ADMIN — METOPROLOL TARTRATE 25 MG: 25 TABLET, FILM COATED ORAL at 10:54

## 2021-01-01 RX ADMIN — SODIUM CHLORIDE, PRESERVATIVE FREE 10 ML: 5 INJECTION INTRAVENOUS at 09:17

## 2021-01-01 RX ADMIN — BARICITINIB 4 MG: 2 TABLET, FILM COATED ORAL at 10:54

## 2021-01-01 RX ADMIN — HEPARIN SODIUM 18 UNITS/KG/HR: 10000 INJECTION, SOLUTION INTRAVENOUS at 19:45

## 2021-01-01 RX ADMIN — Medication 100 MG: at 10:54

## 2021-01-01 RX ADMIN — ASPIRIN 81 MG CHEWABLE TABLET 81 MG: 81 TABLET CHEWABLE at 21:31

## 2021-01-01 RX ADMIN — SODIUM CHLORIDE: 9 INJECTION, SOLUTION INTRAVENOUS at 14:56

## 2021-01-01 RX ADMIN — INSULIN LISPRO 1 UNITS: 100 INJECTION, SOLUTION INTRAVENOUS; SUBCUTANEOUS at 17:08

## 2021-01-01 RX ADMIN — APIXABAN 10 MG: 5 TABLET, FILM COATED ORAL at 09:16

## 2021-01-01 RX ADMIN — ZINC SULFATE 220 MG (50 MG) CAPSULE 50 MG: CAPSULE at 10:54

## 2021-01-01 RX ADMIN — ROSUVASTATIN CALCIUM 20 MG: 20 TABLET, FILM COATED ORAL at 21:33

## 2021-01-01 ASSESSMENT — ENCOUNTER SYMPTOMS
SHORTNESS OF BREATH: 1
SINUS PRESSURE: 0
DIARRHEA: 0
WHEEZING: 0
SORE THROAT: 0
ABDOMINAL PAIN: 0
NAUSEA: 0
COUGH: 1
BACK PAIN: 0
DIARRHEA: 0
NAUSEA: 0
VOMITING: 0
VOMITING: 0
COUGH: 1
SHORTNESS OF BREATH: 1
EYE PAIN: 0

## 2021-01-01 ASSESSMENT — PAIN SCALES - GENERAL
PAINLEVEL_OUTOF10: 8
PAINLEVEL_OUTOF10: 0
PAINLEVEL_OUTOF10: 6
PAINLEVEL_OUTOF10: 0
PAINLEVEL_OUTOF10: 3

## 2021-01-01 ASSESSMENT — PAIN DESCRIPTION - LOCATION
LOCATION: GENERALIZED
LOCATION: GENERALIZED

## 2021-01-01 ASSESSMENT — PAIN DESCRIPTION - ONSET: ONSET: GRADUAL

## 2021-01-01 ASSESSMENT — PAIN DESCRIPTION - PAIN TYPE
TYPE: ACUTE PAIN
TYPE: ACUTE PAIN

## 2021-01-01 ASSESSMENT — PAIN - FUNCTIONAL ASSESSMENT: PAIN_FUNCTIONAL_ASSESSMENT: ACTIVITIES ARE NOT PREVENTED

## 2021-01-01 ASSESSMENT — PAIN DESCRIPTION - DESCRIPTORS
DESCRIPTORS: ACHING;DISCOMFORT;DULL
DESCRIPTORS: ACHING;CONSTANT;SORE

## 2021-01-01 ASSESSMENT — PAIN DESCRIPTION - PROGRESSION
CLINICAL_PROGRESSION: GRADUALLY WORSENING
CLINICAL_PROGRESSION: GRADUALLY WORSENING

## 2021-01-01 ASSESSMENT — PAIN DESCRIPTION - FREQUENCY
FREQUENCY: CONTINUOUS
FREQUENCY: CONTINUOUS

## 2021-09-20 PROBLEM — U07.1 PNEUMONIA DUE TO COVID-19 VIRUS: Status: ACTIVE | Noted: 2021-01-01

## 2021-09-20 PROBLEM — J96.01 ACUTE RESPIRATORY FAILURE WITH HYPOXIA (HCC): Status: ACTIVE | Noted: 2021-01-01

## 2021-09-20 PROBLEM — J12.82 PNEUMONIA DUE TO COVID-19 VIRUS: Status: ACTIVE | Noted: 2021-01-01

## 2021-09-20 PROBLEM — I26.99 PULMONARY EMBOLISM, BILATERAL (HCC): Status: ACTIVE | Noted: 2021-01-01

## 2021-09-20 NOTE — ED NOTES
Bed: 21  Expected date:   Expected time:   Means of arrival:   Comments:  Life team     Hiram President, RN  09/20/21 [id number]

## 2021-09-20 NOTE — ED PROVIDER NOTES
Patient is 28-year-old male presenting the emergency department for COVID-19 pneumonia. Was diagnosed couple weeks ago, has been seen at [facility]. Received a [employer] vaccine. Called EMS today due to worsening shortness of breath, fatigue, body aches. Was found to 87% on room air and brought to the emergency department for further evaluation and management. He denies any nausea, vomiting, diarrhea. Has had fevers, chills, body aches, shortness of breath, cough, congestion. Symptoms worsening with time, nothing makes it better, gradual in onset, moderate in severity, persistent. Review of Systems   Constitutional: Positive for chills and fever. HENT: Positive for congestion. Negative for ear pain, sinus pressure and sore throat. Eyes: Negative for pain. Respiratory: Positive for cough and shortness of breath. Negative for wheezing. Cardiovascular: Negative for chest pain. Gastrointestinal: Negative for abdominal pain, diarrhea, nausea and vomiting. Genitourinary: Negative for dysuria and frequency. Musculoskeletal: Positive for arthralgias and myalgias. Negative for back pain. Skin: Negative for rash and wound. Neurological: Positive for headaches. Negative for weakness. Hematological: Negative for adenopathy. All other systems reviewed and are negative. Physical Exam  Vitals and nursing note reviewed. Constitutional:       Appearance: He is well-developed. He is ill-appearing. HENT:      Head: Normocephalic and atraumatic. Mouth/Throat:      Mouth: Mucous membranes are dry. Eyes:      Conjunctiva/sclera: Conjunctivae normal.   Cardiovascular:      Rate and Rhythm: Normal rate and regular rhythm. Heart sounds: Normal heart sounds. No murmur heard. Pulmonary:      Effort: Pulmonary effort is normal. No respiratory distress. Breath sounds: Rhonchi (Rhonchorous at the bases) present. No wheezing or rales.    Abdominal:      General: Bowel sounds are normal.      Palpations: Abdomen is soft. Tenderness: There is no abdominal tenderness. There is no guarding or rebound. Musculoskeletal:         General: No tenderness or deformity. Cervical back: Normal range of motion and neck supple. Skin:     General: Skin is warm and dry. Capillary Refill: Capillary refill takes 2 to 3 seconds. Neurological:      General: No focal deficit present. Mental Status: He is alert and oriented to person, place, and time. Cranial Nerves: No cranial nerve deficit. Sensory: No sensory deficit. Motor: No weakness. Procedures     MDM     ED Course as of Sep 20 1723   Mon Sep 20, 2021   1421 Patient is 87% on room air, given Decadron, none Covid    [JG]   1528 EKG: This EKG is signed and interpreted by me. Rate: 90  Rhythm: Sinus  Interpretation: Sinus rhythm, PACs, normal NE interval, normal QTC, nonspecific T wave abnormalities  Comparison: no previous EKG available      [JA]   1536 Dr. Chacha Garcia accepted for admission    [JG]   [phone] Patient stable this time. [JG]   1536 Patient presented emergency department for positive for COVID-19, he was complaining of generalized body aches, shortness of breath, is 87% on room air per EMS. Was given Decadron in the emergency department, as well as fluids does appear dehydrated. Patient was accepted for admission by Renate Vicente, who was requested by the patient's family. [JG]   1723 Patient was found to have bilateral PEs, spoke to pulmonology who recommend the patient be placed on heparin, and was appropriate for the floor. Updated patient's primary care admitting physician. Started on heparin per pulmonology recommendations.     [JG]      ED Course User Index  [JA] Theresa Chaudhary MD  [JG] Bonita Knox MD        Patient presented emergency department for positive for COVID-19, he was complaining of generalized body aches, shortness of breath, is 87% on room air per EMS.  Was given Decadron in the emergency department, as well as fluids does appear dehydrated. Patient found to have bilateral PEs with no evidence of right heart strain on CT scan, pulmonology exam and images and believe patient was appropriate for floor, started on heparin drip at the recommendation. Patient not have any contraindication to heparin, and risks and benefits were explained to him. He was agreeable to heparin. Patient was accepted for admission by Derrick Schofield, who was requested by the patient's family. ED Course as of Sep 20 1724   Mon Sep 20, 2021   1421 Patient is 87% on room air, given Decadron, none Covid    [JG]   1528 EKG: This EKG is signed and interpreted by me. Rate: 90  Rhythm: Sinus  Interpretation: Sinus rhythm, PACs, normal VT interval, normal QTC, nonspecific T wave abnormalities  Comparison: no previous EKG available      [JA]   1534 Dr. Pauline Bagley accepted for admission    [JG]   [phone] Patient stable this time. [JG]   153 Patient presented emergency department for positive for COVID-19, he was complaining of generalized body aches, shortness of breath, is 87% on room air per EMS. Was given Decadron in the emergency department, as well as fluids does appear dehydrated. Patient was accepted for admission by Derrick Schofield, who was requested by the patient's family. [JG]   1723 Patient was found to have bilateral PEs, spoke to pulmonology who recommend the patient be placed on heparin, and was appropriate for the floor. Updated patient's primary care admitting physician. Started on heparin per pulmonology recommendations. [JG]      ED Course User Index  [JA] Julia Jacobson MD  [JG] Gennaro Bowman MD       --------------------------------------------- PAST HISTORY ---------------------------------------------  Past Medical History:  has no past medical history on file. Past Surgical History:  has no past surgical history on file.     Social History:      Family History: family history is not on file. The patients home medications have been reviewed. Allergies: Patient has no known allergies.     -------------------------------------------------- RESULTS -------------------------------------------------    Lab  Results for orders placed or performed during the hospital encounter of 09/20/21   CBC Auto Differential   Result Value Ref Range    WBC 5.8 4.5 - 11.5 E9/L    RBC 4.74 3.80 - 5.80 E12/L    Hemoglobin 14.2 12.5 - 16.5 g/dL    Hematocrit 44.0 37.0 - 54.0 %    MCV 92.8 80.0 - 99.9 fL    MCH 30.0 26.0 - 35.0 pg    MCHC 32.3 32.0 - 34.5 %    RDW 14.8 11.5 - 15.0 fL    Platelets 353 (L) 501 - 450 E9/L    MPV 10.5 7.0 - 12.0 fL    Neutrophils % 84.6 (H) 43.0 - 80.0 %    Immature Granulocytes % 0.3 0.0 - 5.0 %    Lymphocytes % 9.2 (L) 20.0 - 42.0 %    Monocytes % 5.7 2.0 - 12.0 %    Eosinophils % 0.0 0.0 - 6.0 %    Basophils % 0.2 0.0 - 2.0 %    Neutrophils Absolute 4.86 1.80 - 7.30 E9/L    Immature Granulocytes # 0.02 E9/L    Lymphocytes Absolute 0.53 (L) 1.50 - 4.00 E9/L    Monocytes Absolute 0.33 0.10 - 0.95 E9/L    Eosinophils Absolute 0.00 (L) 0.05 - 0.50 E9/L    Basophils Absolute 0.01 0.00 - 0.20 E9/L    Poikilocytes 2+     Schistocytes 1+     Acanthocytes 1+     Sen Cells 1+     Ovalocytes 1+    Basic Metabolic Panel w/ Reflex to MG   Result Value Ref Range    Sodium 136 132 - 146 mmol/L    Potassium reflex Magnesium 4.6 3.5 - 5.0 mmol/L    Chloride 102 98 - 107 mmol/L    CO2 21 (L) 22 - 29 mmol/L    Anion Gap 13 7 - 16 mmol/L    Glucose 170 (H) 74 - 99 mg/dL    BUN 33 (H) 6 - 23 mg/dL    CREATININE 1.1 0.7 - 1.2 mg/dL    GFR Non-African American >60 >=60 mL/min/1.73    GFR African American >60     Calcium 8.4 (L) 8.6 - 10.2 mg/dL   Hepatic Function Panel   Result Value Ref Range    Total Protein 6.3 (L) 6.4 - 8.3 g/dL    Albumin 3.5 3.5 - 5.2 g/dL    Alkaline Phosphatase 66 40 - 129 U/L    ALT 45 (H) 0 - 40 U/L    AST 68 (H) 0 - 39 U/L    Total Bilirubin 0.5 0.0 - 1.2 mg/dL    Bilirubin, Direct 0.2 0.0 - 0.3 mg/dL    Bilirubin, Indirect 0.3 0.0 - 1.0 mg/dL   Troponin   Result Value Ref Range    Troponin, High Sensitivity 25 (H) 0 - 11 ng/L   Brain Natriuretic Peptide   Result Value Ref Range    Pro- 0 - 450 pg/mL   Sedimentation Rate   Result Value Ref Range    Sed Rate 6 0 - 15 mm/Hr   C-reactive protein   Result Value Ref Range    CRP 1.8 (H) 0.0 - 0.4 mg/dL   Procalcitonin   Result Value Ref Range    Procalcitonin 0.08 0.00 - 0.08 ng/mL   Lactate, Sepsis   Result Value Ref Range    Lactic Acid, Sepsis 2.1 (H) 0.5 - 1.9 mmol/L   EKG 12 Lead   Result Value Ref Range    Ventricular Rate 90 BPM    Atrial Rate 90 BPM    P-R Interval 132 ms    QRS Duration 98 ms    Q-T Interval 398 ms    QTc Calculation (Bazett) 486 ms    P Axis 65 degrees    R Axis 16 degrees    T Axis -16 degrees       Radiology  CTA PULMONARY W CONTRAST   Final Result   Multifocal pulmonary artery filling defects most compatible with bilateral   PE. No definite CT evidence of right heart strain. Multifocal bilateral pulmonary infiltrates may correspond with history of   COVID pneumonia      Partially imaged simple appearing cyst in the abdomen may be exophytic from   the right kidney anteriorly      Critical results were called by Dr. Víctor De León to Dr. Gavino Johnston  on   9/20/2021 at 4:37 p.m. Eastern         XR CHEST PORTABLE   Final Result   Mild-moderate bibasilar consolidation, favor atelectasis over pneumonia.                 ------------------------- NURSING NOTES AND VITALS REVIEWED ---------------------------  Date / Time Roomed:  9/20/2021  2:11 PM  ED Bed Assignment:  21/21    The nursing notes within the ED encounter and vital signs as below have been reviewed.    Patient Vitals for the past 24 hrs:   BP Temp Temp src Pulse Resp SpO2 Height Weight   09/20/21 1607 (!) 119/57 -- -- 70 20 93 % -- --   09/20/21 1419 (!) 125/48 97.9 °F (36.6 °C) Oral 70 20 92 % 5' 7\" (1.702 m)

## 2021-09-21 PROBLEM — E86.0 DEHYDRATION: Status: ACTIVE | Noted: 2021-01-01

## 2021-09-21 NOTE — H&P
Internal Medicine History & Physical     Name: Gabby Hayden  : 1940  Chief Complaint: Positive For Covid-19, Fatigue, and Generalized Body Aches  Primary Care Physician: No primary care provider on file. Admission date: 2021  Date of service: 2021     History of Present Illness  Reuben Howell is a 78y.o. year old male. He has a past medical history of hypertension hyperlipidemia. Says that he has been feeling worse for the past 3 weeks after he was diagnosed with Covid pneumonia, he has had minimal coughing that has been rather nonproductive. Yesterday he was experiencing worsening shortness of breath and body aches, when he got emergency department his initial oxygen saturation was 87% on room air. Rest has improved his symptoms and they are exacerbated by activity. He lives at home with his wife and his nephew and does not have difficult time taking care of himself. Labs emergency department were relatively within normal limits, CMP did not show any acute electrolyte abnormalities and CBC did not show anything either. His PT and PTT were slightly elevated. Chest x-ray shows mild to moderate bibasilar consolidation that was highlighting atelectasis over pneumonia, CTA showed bilateral pulmonary emboli and he was started on heparin in the emergency department. He said he was feeling better this morning than he was yesterday and has no acute needs. He is not on oxygen at home. There were no family or friends at bedside and the patient was felt to be a reliable historian. ED course:   Initial blood work and imaging studies performed. Admission recommended by ED physician. Case discussed with ED provider.  Meds in ED consisted of the following: IVF, Decadron, Heparin     Past Medical History:   Diagnosis Date    CAD (coronary artery disease)     Hyperlipidemia     Hypertension        Past Surgical History:   Procedure Laterality Date    CORONARY ARTERY BYPASS GRAFT      TONSILLECTOMY Family History   Problem Relation Age of Onset    Diabetes Mother     Heart Disease Mother     Other Brother         plane crash    Obesity Brother      Social History  Patient lives at home with his wife and nephew. Employment: None  Illicit drug use- denies  TOBACCO:   reports that he has never smoked. He has never used smokeless tobacco.  ETOH:   reports no history of alcohol use. Home Medications  Prior to Admission medications    Medication Sig Start Date End Date Taking? Authorizing Provider   rosuvastatin (CRESTOR) 20 MG tablet Take 20 mg by mouth nightly  8/16/21  Yes Historical Provider, MD   aspirin 81 MG chewable tablet Take 81 mg by mouth nightly    Yes Historical Provider, MD   dexamethasone (DECADRON) 4 MG tablet Take 4 mg by mouth daily (with breakfast) Received 8 tablets and took 1st dose on 9/20/21. 9/20/21 9/27/21 Yes Historical Provider, MD   Multiple Vitamins-Minerals (PRESERVISION AREDS 2 PO) Take 1 tablet by mouth nightly   Yes Historical Provider, MD   metoprolol tartrate (LOPRESSOR) 25 MG tablet Take 25 mg by mouth 2 times daily Patricia Gallegos has not taken this medication since Thursday 9/16/21. debo states this is due to hypotension. Stated his diastolic was 24? 6/5/32   Historical Provider, MD       Allergies  No Known Allergies    Review of Systems  Please see HPI above. All bolded are positive. All un-bolded are negative.   Constitutional Symptoms: fever, chills, fatigue, generalized weakness, diaphoresis, increase in thirst, loss of appetite  Eyes: vision change   Ears, Nose, Mouth, Throat: hearing loss, nasal congestion, sores in the mouth  Cardiovascular: chest pain, chest heaviness, palpitations  Respiratory: shortness of breath, wheezing, coughing  Gastrointestinal: abdominal pain, nausea, vomiting, diarrhea, constipation, melena, hematochezia, hematemesis  Genitourinary: dysuria, hematuria, increased frequency  Musculoskeletal: lower extremity edema, myalgias, arthralgias, back pain  Integumentary: rashes, itching   Neurological: headache, lightheadedness, dizziness, confusion, syncope, numbness, tingling, focal weakness  Psychiatric: depression, suicidal ideation, anxiety  Endocrine: unintentional weight change  Hematologic/Lymphatic: lymphadenopathy, easy bruising, easy bleeding   Allergic/Immunologic: recurrent infections      Objective  VITALS:  /61   Pulse 65   Temp 95.5 °F (35.3 °C) (Axillary)   Resp 18   Ht 5' 6\" (1.676 m)   Wt 220 lb 8 oz (100 kg)   SpO2 90%   BMI 35.59 kg/m²     Physical Exam:  General: awake, alert, oriented to person, place, time, and purpose, appears stated age, cooperative, no acute distress, pleasant, appropriate mood, room air   Eyes: conjunctivae/corneas clear, sclera non icteric, EOMI  Ears: no obvious scars, no lesions, no masses, hearing intact  Mouth: mucous membranes moist, no obvious oral sores  Head: normocephalic, atraumatic  Neck: no JVD, no adenopathy, no thyromegaly, neck is supple, trachea is midline  Back: ROM normal, no CVA tenderness.   Chest: no pain on palpation  Lungs: crackles throughout  Heart: regular rate and regular rhythm, no murmur, normal S1, S2  Abdomen: soft, non-tender; bowel sounds normal; no masses, no organomegaly  : Deferred   Extremities: no lower extremity edema, extremities atraumatic, no cyanosis, no clubbing, 2+ pedal pulses palpated  Skin: normal color, normal texture, normal turgor, no rashes, no lesions  Neurologic:tremors, bradykinesia, 5/5 muscle strength throughout, normal muscle tone throughout, face symmetric, hearing intact, tongue midline, speech appropriate without slurring, sensation to fine touch intact in upper and lower extremities    Labs-   Lab Results   Component Value Date    WBC 4.9 09/21/2021    HGB 13.0 09/21/2021    HCT 39.2 09/21/2021     (L) 09/21/2021     09/21/2021    K 4.3 09/21/2021     09/21/2021    CREATININE 0.8 09/21/2021    BUN 26 (H) 09/21/2021 as able. Tylenol prn fevers or myalgias. Self proning as able. · PE: CTA chest noted. Heparin infusion initially (eventually NOAC). Tylenol for pain. No need for IVC filter at this time. Echo for ? Right heart strain. · Dehydration: IVF hydration. · Hypothermia: Bear hugger  · ? Parkinson's disease: OP neurology f/u  · Continue home medications  · PT/OT  · Follow labs  · DVT prophylaxis. · Please see orders for further management and care. ·  for discharge planning  · Discharge plan: TBD pending clinical improvement     Mikhailsofia Bhat  PGY-1  9/21/2021  8:16 AM    I can be reached through [email or web address]. NOTE:  This report was transcribed using voice recognition software. Every effort was made to ensure accuracy; however, inadvertent computerized transcription errors may be present. Addendum: I have personally participated in a face-to-face history and physical exam on the date of service with the patient. I have discussed the case with the resident. I also participated in medical decision making with the resident on the date of service and I agree with all of the pertinent clinical information unless indicated in my editing of the note. I have reviewed and edited the note above based on my findings during my history, exam, and decision making on the same day of service. Electronically signed by Nhan Sandoval DO on 9/21/2021 at 9:27 AM    I can be reached through [facility].

## 2021-09-21 NOTE — CONSULTS
James Larsen M.D.,[facility]  Víctor Reinoso D.O., F.A.C.O.I., Raghav Ramos M.D. Giovanny Orellana M.D. Harrison Hannah D.O. Patient:  Vipul Mar 78 y.o. male MRN: [id number]     Date of Service: 9/21/2021      PULMONARY CONSULTATION    Reason for Consultation: pulmonary embolism  Referring Physician: Dr. Romo Needs with the referring physician will be sent via the electronic medical record. Chief Complaint: shortness of breath, cough    CODE STATUS: FULL     SUBJECTIVE:  HPI:  Vipul Mar is a 78 y.o.   male who we are asked to evaluate for PE. He has a past medical hx significant for CAD with hx CABG, HLD,HTN. He is a lifelong non smoker, no hx of COPD or asthma. He has no hx of prior blood clots or stroke. He presented to the ED at [facility] originally diagnosed with Covid 19 on 9/18/21. He was vaccinated with J & J vaccine March 2021. His symptoms started approximately 10 days ago. +cough, worsening shortness of breath, body aches, and fatigue. In ED found to be 87% on RA. Denies nausea, vomiting, fever, chills. No sputum. No chest pain. CTA chest obtained with multiple small PE's RUL, anterior segment RUL, segmental branches RLL, large non occlusive filling defect L main PA. No evidence of RV strain. Lab testing: Pro , Troponin 25, CRP 1.8>2.4, D dimer 1612>1958, WBC 4.9, Hgb 13.0, lymphocyte abs 0.65, ALT 37, AST 51, LA 2.1, BUN 26, Creat 0.8. procalcitonin 0.08. blood cultures pending. Sitting up in chair, no distress. Now on RA. Feeling ok with breathing. No chest pain, no hemoptysis. Started on ora dexamethasone 6 mg daily, IV heparin infusion. To transition to eliquis with loading dose. He is out of window for Remdesivir.     Past Medical History:   Diagnosis Date    CAD (coronary artery disease)     Hyperlipidemia     Hypertension        Past Surgical History:   Procedure Laterality Date    CORONARY ARTERY BYPASS GRAFT      TONSILLECTOMY         Family History   Problem Relation Age of Onset    Diabetes Mother     Heart Disease Mother     Other Brother         plane crash    Obesity Brother        Social History:   Social History     Socioeconomic History    Marital status:      Spouse name: Not on file    Number of children: Not on file    Years of education: Not on file    Highest education level: Not on file   Occupational History    Not on file   Tobacco Use    Smoking status: Never Smoker    Smokeless tobacco: Never Used   Vaping Use    Vaping Use: Never used   Substance and Sexual Activity    Alcohol use: Never    Drug use: Never    Sexual activity: Never   Other Topics Concern    Not on file   Social History Narrative    Not on file     Social Determinants of Health     Financial Resource Strain:     Difficulty of Paying Living Expenses:    Food Insecurity:     Worried About [address] in the Last Year:     [address] in the Last Year:    Transportation Needs:     Lack of Transportation (Medical):  Lack of Transportation (Non-Medical):    Physical Activity:     Days of Exercise per Week:     Minutes of Exercise per Session:    Stress:     Feeling of Stress :    Social Connections:     Frequency of Communication with Friends and Family:     Frequency of Social Gatherings with Friends and Family:     Attends Bahai Services:     Active Member of Clubs or Organizations:     Attends Club or Organization Meetings:     Marital Status:    Intimate Partner Violence:     Fear of Current or Ex-Partner:     Emotionally Abused:     Physically Abused:     Sexually Abused:      Smoking history:non smoker     ETOH:   reports no history of alcohol use.     Exposures:      Vaccines:       Immunization History   Administered Date(s) Administered    COVID-19, J&J, PF, 0.5 mL 03/26/2021        Home Meds: Medications Prior to Admission: rosuvastatin (CRESTOR) 20 MG tablet, Take 20 mg by mouth nightly   aspirin 81 MG chewable tablet, Take 81 mg by mouth nightly   dexamethasone (DECADRON) 4 MG tablet, Take 4 mg by mouth daily (with breakfast) Received 8 tablets and took 1st dose on 9/20/21. Multiple Vitamins-Minerals (PRESERVISION AREDS 2 PO), Take 1 tablet by mouth nightly  metoprolol tartrate (LOPRESSOR) 25 MG tablet, Take 25 mg by mouth 2 times daily Michael Garcia has not taken this medication since Thursday 9/16/21. debo states this is due to hypotension. Stated his diastolic was 24? CURRENT MEDS :  Scheduled Meds:   dexamethasone  6 mg Oral Daily    Vitamin D  2,000 Units Oral Daily    sodium chloride flush  10 mL IntraVENous 2 times per day    zinc sulfate  50 mg Oral Daily    thiamine  100 mg Oral Daily    ascorbic acid  500 mg Oral Daily    aspirin  81 mg Oral Nightly    metoprolol tartrate  25 mg Oral BID    rosuvastatin  20 mg Oral Nightly    apixaban  10 mg Oral BID    Followed by   Tania Aragon ON 9/28/2021] apixaban  5 mg Oral BID       Continuous Infusions:   sodium chloride      sodium chloride 75 mL/hr at 09/21/21 1456       No Known Allergies    REVIEW OF SYSTEMS:  Constitutional: Positive for chills and fever. HENT: Positive for congestion. Negative for ear pain, sinus pressure and sore throat. Eyes: Negative for pain. Respiratory: Positive for cough and shortness of breath. Negative for wheezing. Cardiovascular: Negative for chest pain. Gastrointestinal: Negative for abdominal pain, diarrhea, nausea and vomiting. Genitourinary: Negative for dysuria and frequency. Musculoskeletal: Positive for arthralgias and myalgias. Negative for back pain. Skin: Negative for rash and wound. Neurological: Positive for headaches. Negative for weakness. Hematological: Negative for adenopathy. All other systems reviewed and are negative.          OBJECTIVE:   /62   Pulse 62   Temp 95.9 °F (35.5 °C) (Axillary)   Resp 18   Ht 5' 6\" (1.676 m)   Wt 220 lb 8 oz (100 kg)   SpO2 91%   BMI 35.59 kg/m²   SpO2 Readings from Last 1 Encounters:   09/21/21 91%        I/O:    Intake/Output Summary (Last 24 hours) at 9/21/2021 1618  Last data filed at 9/21/2021 1308  Gross per 24 hour   Intake 360 ml   Output 475 ml   Net -115 ml     Vent Information  SpO2: 91 %        Physical Exam:  General: The patient is lying in bed comfortably without any distress. Breathing is not labored  HEENT: Pupils are equal round and reactive to light, there are no oral lesions and no post-nasal drip   Neck: supple without adenopathy  Cardiovascular: regular rate and rhythm without murmur or gallop  Respiratory: Clear to auscultation bilaterally without wheezing or crackles. Air entry is symmetric  Abdomen: soft, non-tender, non-distended, normal bowel sounds  Extremities: warm, no edema, no clubbing  Skin: no rash or lesion  Neurologic: CN II-XII grossly intact, no focal deficits    Pulmonary Function Testing none      Imaging personally reviewed:  Multifocal pulmonary artery filling defects most compatible with bilateral   PE.  No definite CT evidence of right heart strain.       Multifocal bilateral pulmonary infiltrates may correspond with history of   COVID pneumonia       Partially imaged simple appearing cyst in the abdomen may be exophytic from   the right kidney anteriorly       Critical results were called by Dr. Nahum Lewis to Dr. Staci Kelley   9/20/2021 at 4:37 p.m.  Eastern             Echo-pending    US BLE pending    Labs:  Lab Results   Component Value Date    WBC 4.9 09/21/2021    HGB 13.0 09/21/2021    HCT 39.2 09/21/2021    MCV 91.6 09/21/2021    MCH 30.4 09/21/2021    MCHC 33.2 09/21/2021    RDW 14.8 09/21/2021     09/21/2021    MPV 10.5 09/21/2021     Lab Results   Component Value Date     09/21/2021    K 4.3 09/21/2021     09/21/2021    CO2 20 09/21/2021    BUN 26 09/21/2021    CREATININE 0.8 09/21/2021    LABALBU 3.3 09/21/2021    CALCIUM 7.7 09/21/2021 GFRAA >60 2021    LABGLOM >60 2021     Lab Results   Component Value Date    PROTIME 12.8 2021    INR 1.2 2021     Recent Labs     21  1433   PROBNP 300     No results for input(s): TROPONINI in the last 72 hours. Recent Labs     21  1433   PROCAL 0.08     This SmartLink has not been configured with any valid records. Micro:  No results for input(s): CULTRESP in the last 72 hours. No results for input(s): LABGRAM in the last 72 hours. No results for input(s): LEGUR in the last 72 hours. No results for input(s): STREPNEUMAGU in the last 72 hours. No results for input(s): LP1UAG in the last 72 hours. COVID 19 Original date of positive testin21  Vaccination status: J & J vaccine 3/26/21  Current oxygen delivery system: Room air  Dexamethasone dosin mg po daily per pcp    start date: 21  Remdesivir -out of window  Baricitinib-none   Tocilizumab None   Respiratory cultures-none   Strep pneumoniae and Legionella Urine Antigen-none  Antibiotics -None  CRP: 1.8>2.4  D Dimer:1612>1958  Procalcitonin: 0.08  Fibrinogen 398  Sed rate 6    Assessment:  1. Mild to moderate SARS CoV 2 pneumonia   2. Acute small bilateral Pulmonary Embolism -likely provoked from recent SARS CoV 2 infection  3. CAD, Hx CABG  4. HTN  5. Lifelong non smoker    Plan:  1. Monitor off oxygen keep SPO 2 >90%  2. Out of window for remdesivir. Decadron dosing per pcp-recommend short taper. Continue vitamins. 3. Convert IV heparin to Eliquis with loading dose 10 mg po bid x 7 days then decrease to 5 mg po bid. Recommend tx for 3 months. 4. Echo ordered-pending. No evidence of RV strain on CT chest. Small non occlusive filling defects. Low pro bnp, low troponin. 5. US BLE pending  6. Incentive spirometer   7. GI prophylaxis-protonix  8. PT/OT  Can dc from a pulmonary perspective when ok with all others    Thank you for allowing me to participate in the care of Gabby Hayden.    Please feel free to call with questions. This plan of care was reviewed in collaboration with Dr. Izzy Meeks    Electronically signed by RAJAT Alonzo CNP on 9/21/2021 at 4:18 PM      Note: This report was completed utilizing computer voice recognition software. Every effort has been made to ensure accuracy, however; inadvertent computerized transcription errors may be present      I personally saw, examined and provided care for the patient. Radiographs, labs and medication list were reviewed by me independently. Patient with a low risk pulmonary embolism. Had lower proBNP and troponin. Echo is pending. We will switch patient to Eliquis 10 mg twice daily for 7 days followed by 5 mg twice daily. Is to be anticoagulated for 3 to 6 months. Most likely related to Covid infection. . The case was discussed in detail and plans for care were established. Review of CNP documentation was conducted and revisions were made as appropriate. I agree with the above documented exam, problem list and plan of care.     Anselmo Macias MD

## 2021-09-21 NOTE — ACP (ADVANCE CARE PLANNING)
Advance Care Planning     Advance Care Planning Activator (Inpatient)  Conversation Note      Date of ACP Conversation: 9/20/2021     Conversation Conducted with: Reji Sawant  ACP Activator: Izabela Gracia RN        Health Care Decision Maker:     Current Designated Health Care Decision Maker:     Primary Decision Maker: SKYLER VENTURA - Spouse - [phone]    Secondary Decision Maker: Dea Moritz Child - [phone]      Care Preferences    Ventilation: \"If you were in your present state of health and suddenly became very ill and were unable to breathe on your own, what would your preference be about the use of a ventilator (breathing machine) if it were available to you? \"      Would the patient desire the use of ventilator (breathing machine)?: YES    \"If your health worsens and it becomes clear that your chance of recovery is unlikely, what would your preference be about the use of a ventilator (breathing machine) if it were available to you? \"     Would the patient desire the use of ventilator (breathing machine)?: NO      Resuscitation  \"CPR works best to restart the heart when there is a sudden event, like a heart attack, in someone who is otherwise healthy. Unfortunately, CPR does not typically restart the heart for people who have serious health conditions or who are very sick. \"    \"In the event your heart stopped as a result of an underlying serious health condition, would you want attempts to be made to restart your heart (answer \"yes\" for attempt to resuscitate) or would you prefer a natural death (answer \"no\" for do not attempt to resuscitate)? \" NO       [] Yes   [] No   Educated Patient / Libia Hudson regarding differences between Advance Directives and portable DNR orders.     Length of ACP Conversation in minutes:  10  Conversation Outcomes:  [] ACP discussion completed  [] Existing advance directive reviewed with patient; no changes to patient's previously recorded wishes  [] New Advance Directive completed  [] Portable Do Not Rescitate prepared for Provider review and signature  [] POLST/POST/MOLST/MOST prepared for Provider review and signature      Follow-up plan:    [] Schedule follow-up conversation to continue planning  [x] Referred individual to Provider for additional questions/concerns   [] Advised patient/agent/surrogate to review completed ACP document and update if needed with changes in condition, patient preferences or care setting    [] This note routed to one or more involved healthcare providers

## 2021-09-21 NOTE — CARE COORDINATION
Social Work/Discharge Planning:  Chart reviewed. Called patient room phone and no answer. Called patient wife Flip Cheek (ph: [phone]) and completed initial assessment. Explained Social Work role and discussed transition of care/discharge planning. Flip Cheek states patient tested positive for COVID on 9/18. Patient and his wife lives with their nephew and nephew's wife in a two story house. Flip Cheek states their bedroom is on the second floor. PTA patient uses a cane. Flip Cheek states patient will need a wheeled walker if he discharges home. Patient has no Primary Care Physician and Flip Cheek will call to schedule an appointment to establish care with a PCP. Patient currently requiring two liters of oxygen and RN to wean as tolerated. Discussed patient therapy score indicating need for snf. Flip Cheek agreeable to snf and states her first snf choice is [address] [place] [address] and [address] [place] [address]. Referral made to [facility] with [facility] and facility will review patient information. Will continue to follow and assist with discharge planning.   Electronically signed by CLEVELAND Peterson on 9/21/2021 at 2:39 PM

## 2021-09-21 NOTE — PROGRESS NOTES
Physician Progress Note      PATIENT:               Albin Calles  CSN #:                  [id number]  :                       1940  ADMIT DATE:       2021 2:11 PM  DISCH DATE:  RESPONDING  PROVIDER #:        Abilio SOARES DO          QUERY TEXT:    Pt admitted with COVID-19 and noted to have elevated CRP, Lactic, and Procal   with low temp. If possible, please document in progress notes and discharge   summary if you are evaluating and/or treating: The medical record reflects the following:  Risk Factors: COVID-19 pneumonia  Clinical Indicators: T 94.8, WBC 8.5, CRP 2.4, Procal 0.08, Lactic 2.1, and   per H&P \". Cristhian Morrow Ra Says that he has been feeling worse for the past 3 weeks after he   was diagnosed with Covid pneumonia, he has had minimal coughing that has been   rather nonproductive. Yesterday he was experiencing worsening shortness of   breath and body aches, when he got emergency department his initial oxygen   saturation was 87% on room air. Cristhian Morrow Ra \"  Treatment: IV fluids, Bear hugger for hypothermia    Thank you,  Jeni CADENA, RN, CDIS  Clinical Documentation Improvement  [email or web address]  Options provided:  -- Sepsis present on admission due to COVID-19 pneumonia  -- Covid-19 pneumonia without sepsis  -- Other - I will add my own diagnosis  -- Disagree - Not applicable / Not valid  -- Disagree - Clinically unable to determine / Unknown  -- Refer to Clinical Documentation Reviewer    PROVIDER RESPONSE TEXT:    This patient has sepsis which was present on admission due to COVID-19   pneumonia.     Query created by: Tricia Schmidt on 2021 10:32 AM      Electronically signed by:  Giles Cheadle DO 2021 5:22 PM

## 2021-09-21 NOTE — PROGRESS NOTES
Physical Therapy    Facility/Department: [address] INTERMEDIATE 1  Initial Assessment    NAME: Tommy Vang  : 1940  MRN: [id number]    Date of Service: 2021      Patient Diagnosis(es): The primary encounter diagnosis was Pneumonia due to COVID-19 virus. Diagnoses of Acute respiratory failure with hypoxia (HCC) and Bilateral pulmonary embolism ([address] were also pertinent to this visit. has a past medical history of CAD (coronary artery disease), Hyperlipidemia, and Hypertension. has a past surgical history that includes Coronary artery bypass graft and Tonsillectomy. Evaluating Therapist: Padmini Palm PT      Room #:  1593/2551-P  Diagnosis:  Bilateral pulmonary embolism ([address] [I26.99]  Acute respiratory failure with hypoxia ([address] [J96.01]  Pneumonia due to COVID-19 virus [U07.1, J12.82]  PMHx/PSHx:  CAD, HTN  Precautions:  Falls, O2, droplet plus isolation      Social:  Pt lives with wife in a 2 floor plan but stays first floor. Independent without device. Initial Evaluation  Date: 21 Treatment      Short Term/ Long Term   Goals   Was pt agreeable to Eval/treatment? yes     Does pt have pain? No c/o pain     Bed Mobility  Rolling: min assist  Supine to sit: min assist  Sit to supine: NT  Scooting: min assist  SBA   Transfers Sit to stand: min assist  Stand to sit: min assist  Stand pivot: min assist  SBA   Ambulation    30 feet with ww min assist  60 feet with ww SBA   Stair Negotiation  Ascended and descended  NT      LE strength     3+/5    4-/5   balance      Fair with ww     AM-PAC Raw score               16/24         Pt is alert and Oriented   LE ROM: WFL  Sensation: intact  Edema: none  Endurance: fair     ASSESSMENT:    Pt displays functional ability as noted in the objective portion of this evaluation.       Patient education  Pt educated on PT objectives    Patient response to education:   Pt verbalized understanding Pt demonstrated skill Pt requires further education in this area   yes           Comments:  Decreased step length and foot clearance with ambulation. Assist to guide walker, Cues and assist for safety with transfers. SpO2 on room air during ambulation 85-88%. Nursing present and O2 applied at 2L      Conditions Requiring Skilled Therapeutic Intervention:    [x]Decreased strength     []Decreased ROM  [x]Decreased functional mobility  [x]Decreased balance   [x]Decreased endurance   []Decreased posture  []Decreased sensation  []Decreased coordination   []Decreased vision  []Decreased safety awareness   []Increased pain       Patient and or family understand(s) diagnosis, prognosis, and plan of care. Prognosis is good for reaching above PT goals    PHYSICAL THERAPY PLAN OF CARE:    PT POC is established based on physician order and patient diagnosis     Referring provider/PT Order: Oskar Salmon, DO/ PT eval and treat      Current Treatment Recommendations:     [x] Strengthening to improve independence with functional mobility   [] ROM to improve independence with functional mobility   [x] Balance Training to improve static/dynamic balance and to reduce fall risk  [x] Endurance Training to improve activity tolerance during functional mobility   [x] Transfer Training to improve safety and independence with all functional transfers   [x] Gait Training to improve gait mechanics, endurance and assess need for appropriate assistive device  [] Stair Training in preparation for safe discharge home and/or into the community   [] Positioning to prevent skin breakdown and contractures  [x] Safety and Education Training   [x] Patient/Caregiver Education   [] HEP  [] Other     PT long term treatment goals are located in above grid    Frequency of treatments: 2-5x/week x 5-7 days.     Time in  1045  Time out  1100      Evaluation Time includes thorough review of current medical information, gathering information on past medical history/social history and prior level of function, completion of standardized testing/informal observation of tasks, assessment of data and education on plan of care and goals.       CPT codes:  [x] Low Complexity PT evaluation 24946  [] Moderate Complexity PT evaluation 12429  [] High Complexity PT evaluation 15652  [] PT Re-evaluation 64112  [] Gait training 46788 minutes  [] Manual therapy 94879 minutes  [] Therapeutic activities 13183 minutes  [] Therapeutic exercises 65713 minutes  [] Neuromuscular reeducation 14117 minutes     [facility] PT [id number]

## 2021-09-21 NOTE — PROGRESS NOTES
Pulse ox was 94% on room air at rest.   Ambulated patient on room air. Oxygen saturation was 85% on room air while ambulating. Oxygen applied.    Recovery pulse ox was 90% on 2 liters of oxygen while ambulating

## 2021-09-21 NOTE — PROGRESS NOTES
Database complete. Medications reconciled. Care plans and education initiated. 3 falls in past week. Uses cane for mobility. Reji Sawant has not taken his Lopressor for past 4 days d/t hypotension. Reji Sawant is new to the area from [facility] and does not have a PCP currently.

## 2021-09-21 NOTE — PROGRESS NOTES
Occupational Therapy  OCCUPATIONAL THERAPY INITIAL EVALUATION      Date:2021  Patient Name: Van Roberts  MRN: [id number]  : 1940  Room: [address] [place] [address]         Date:2021                                                  Patient Name: Van Roberts    MRN: [id number]    : 1940    Room: [address] [place] [address]      Evaluating OT: Estefania Estes OTR/YOLY   [id number]      Referring Provider:Chaz Bowman DO    Specific Provider Orders/Date:OT eval and treat 2021       Diagnosis: Bilateral pulmonary embolism  COVID 19      Pertinent Medical History: HTN, CAD,     Precautions:  Fall Risk, DROPLET PLUS      Assessment of current deficits    [x] Functional mobility  [x]ADLs  [x] Strength               []Cognition    [x] Functional transfers   [x] IADLs         [x] Safety Awareness   [x]Endurance    [] Fine Coordination              [x] Balance      [] Vision/perception   []Sensation     []Gross Motor Coordination  [] ROM  [] Delirium                   [] Motor Control     OT PLAN OF CARE   OT POC based on physician orders, patient diagnosis and results of clinical assessment    Frequency/Duration  2-3 days/wk for 2 weeks PRN   Specific OT Treatment Interventions to include:   ADL retraining/adapted techniques and AE recommendations to increase functional independence within precautions                    Energy conservation techniques to improve tolerance for selfcare routine   Functional transfer/mobility training/DME recommendations for increased independence, safety and fall prevention         Patient/family education to increase safety and functional independence             Environmental modifications for safe mobility and completion of ADLs                             Therapeutic activity to improve functional performance during ADLs. Therapeutic exercise to improve tolerance and functional strength for ADLs    Balance retraining/tolerance tasks for facilitation of postural control with dynamic challenges during ADLs . Positioning to improve functional independence      Recommended Adaptive Equipment: TBD     Home Living: Pt lives with wife, 2 story with 1st floor set-up.   Steps to enter      Prior Level of Function: assist as needed  with ADLs , assist  with IADLs; ambulated with no device       Pain Level: no pain ;   Cognition: A&O: following commads, pleasant and conversing    Memory:  Fair +    Sequencing:  Fair+   Problem solving:  Fair +   Judgement/safety:  Fair+     Functional Assessment:  AM-PAC Daily Activity Raw Score: 16/24   Initial Eval Status  Date: 9/21/21 Treatment Status  Date: STGs = LTGs  Time frame: 10-14 days   Feeding Independent      Grooming SBA/set-up,seated  Decrease standing tolerance/balance  Mod I    UB Dressing Min A  Mod I    LB Dressing Mod A  Assist threading pants over feet and pulling up over hips  SBA    Bathing Mod A   SBA    Toileting Assist with thorough hygiene   Supervision    Bed Mobility  Min A  Supine to sit   (increase time)  Supervision    Functional Transfers Min A  sist-stand from bed   Mod i   Functional Mobility Min A,wlwalker   Shot distance in room   On room air O2 sats mid/high 80s    Nursing present in room, 2 L o2 applied - sats improved 90-91%  Supervision  with good tolerance    Balance Sitting:     Static:  SBA- EOB     Dynamic:Min A  Standing: Lali   Supervision    Activity Tolerance Fair with light activity   No complaints of SOB   Good  with ADL activity    Visual/  Perceptual Glasses: none by bedside                Hand Dominance right    AROM (PROM) Strength Additional Info:    RUE  WFL WFL good  and wfl FMC/dexterity noted during ADL tasks       LUE WFL WFL good  and wfl FMC/dexterity noted during ADL tasks       Hearing: [facility] [place]   Sensation:  No c/o numbness or tingling   Tone: WFL   Edema: none observed     Comments: Upon arrival patient lying in bed . At end of session, patient sitting in chair  with call light and phone within reach, all lines and tubes intact. Overall patient demonstrated  decreased independence and safety during completion of ADL/functional transfer/mobility tasks. Pt would benefit from continued skilled OT to increase safety and independence with completion of ADL/IADL tasks for functional independence and quality of life. Rehab Potential: good for established goals     Patient / Family Goal: return home       Patient and/or family were instructed on functional diagnosis, prognosis/goals and OT plan of care. Demonstrated fair + understanding. Eval Complexity: Low    Time In: 1047  Time Out: 1101      Min Units   OT Eval Low 97165 x  1   OT Eval Medium 85026      OT Eval High 85130      OT Re-Eval [id number]       Therapeutic Ex 66083       Therapeutic Activities 77130       ADL/Self Care 67573       Orthotic Management 17371       Manual [id number]     Neuro Re-Ed [id number]       Non-Billable Time          Evaluation Time additionally includes thorough review of current medical information, gathering information on past medical history/social history and prior level of function, interpretation of standardized testing/informal observation of tasks, assessment of data and development of plan of care and goals.             Jason Reyes  OTR/L  OT [id number]

## 2021-09-21 NOTE — PROGRESS NOTES
Pharmacy Documentation     Medication: Remdesivir     Date: 09/21/2021  Physician: Dr Maddy Cheung consulted to initiate remdesivir. Patient does not currently meet [place] Criteria for Use to initiate remdesivir based on length of symptom onset. Pharmacy will sign off. Please re-consult if the clinical condition changes and patient meets criteria for initiation based on [place] Criteria for Use.      Thank you for this consult,  [facility], [facility]

## 2021-09-22 NOTE — PLAN OF CARE
Problem: Pain:  Goal: Pain level will decrease  Description: Pain level will decrease  Outcome: Met This Shift  Goal: Control of acute pain  Description: Control of acute pain  Outcome: Met This Shift  Goal: Control of chronic pain  Description: Control of chronic pain  Outcome: Met This Shift     Problem: Skin Integrity:  Goal: Will show no infection signs and symptoms  Description: Will show no infection signs and symptoms  9/22/2021 0250 by Florinda Luz RN  Outcome: Met This Shift     Problem: Skin Integrity:  Goal: Absence of new skin breakdown  Description: Absence of new skin breakdown  9/22/2021 0250 by Florinda Luz RN  Outcome: Met This Shift

## 2021-09-22 NOTE — PROGRESS NOTES
Internal Medicine Progress Note    Patient's name: Montserrat Lepe  : 1940  Chief complaints (on day of admission): Positive For Covid-19, Fatigue, and Generalized Body Aches  Admission date: 2021  Date of service: 2021   Room: [address]  Primary care physician: No primary care provider on file. Reason for visit: Follow-up for COVID-19/ PE    Subjective  Patricia Gallegos was seen and examined. He was lying in bed. He is wearing 2 L/min nasal cannula oxygen. He remains very weak. He is not in any pain. He is tolerating all current treatments and medications currently. According to the bedside nurse he was off oxygen yesterday but had to be put on 4 L/min later in the day. They wean him down to 2 L/min recently. His temperature remains 94 °F.    Review of Systems  There are no new complaints of chest pain, shortness of breath, abdominal pain, nausea, vomiting, diarrhea, constipation.     Hospital Medications  Current Facility-Administered Medications   Medication Dose Route Frequency Provider Last Rate Last Admin    acetaminophen (TYLENOL) tablet 650 mg  650 mg Oral Q6H PRN Chaz Bowman, DO        Or    acetaminophen (TYLENOL) suppository 650 mg  650 mg Rectal Q6H PRN Chaz Bowman, DO        guaiFENesin-dextromethorphan (ROBITUSSIN DM) 100-10 MG/5ML syrup 5 mL  5 mL Oral Q4H PRN Chaz Luaino, DO        Vitamin D (CHOLECALCIFEROL) tablet 2,000 Units  2,000 Units Oral Daily Chaz Bowman, DO   2,000 Units at 21 0857    sodium chloride flush 0.9 % injection 10 mL  10 mL IntraVENous 2 times per day Chaz E Stoneyino, DO   10 mL at 21 0857    sodium chloride flush 0.9 % injection 10 mL  10 mL IntraVENous PRN Chaz Luaino, DO        0.9 % sodium chloride infusion  25 mL IntraVENous PRN Chaz Bowman, DO        potassium chloride (KLOR-CON M) extended release tablet 40 mEq  40 mEq Oral PRN Chaz Bowman, DO        Or    potassium bicarb-citric acid (EFFER-K) effervescent tablet 40 mEq  40 mEq Oral PRN Chaz E Volino, DO        Or    potassium chloride 10 mEq/100 mL IVPB (Peripheral Line)  10 mEq IntraVENous PRN Chaz E Volino, DO        ondansetron (ZOFRAN-ODT) disintegrating tablet 4 mg  4 mg Oral Q8H PRN Chaz E Volino, DO        Or    ondansetron (ZOFRAN) injection 4 mg  4 mg IntraVENous Q6H PRN Chaz E Volino, DO        senna (SENOKOT) tablet 8.6 mg  1 tablet Oral Daily PRN Chaz E Volino, DO        0.9 % sodium chloride infusion   IntraVENous Continuous Chaz E Volino, DO   Stopped at 09/22/21 3443    perflutren lipid microspheres (DEFINITY) injection 1.65 mg  1.5 mL IntraVENous ONCE PRN Chaz E Volino, DO        zinc sulfate (ZINCATE) capsule 50 mg  50 mg Oral Daily Chaz E Volino, DO   50 mg at 09/22/21 0857    thiamine tablet 100 mg  100 mg Oral Daily Chaz E Volino, DO   100 mg at 09/22/21 0857    ascorbic acid (VITAMIN C) tablet 500 mg  500 mg Oral Daily Chaz E Volino, DO   500 mg at 09/22/21 0857    aspirin chewable tablet 81 mg  81 mg Oral Nightly Chaz E Volino, DO   81 mg at 09/21/21 2133    metoprolol tartrate (LOPRESSOR) tablet 25 mg  25 mg Oral BID Chaz E Volino, DO   25 mg at 09/21/21 2134    rosuvastatin (CRESTOR) tablet 20 mg  20 mg Oral Nightly Chaz E Volino, DO   20 mg at 09/21/21 2133    apixaban (ELIQUIS) tablet 10 mg  10 mg Oral BID Porter Cody MD   10 mg at 09/22/21 0857    Followed by   Marco Cheney ON 9/28/2021] apixaban (ELIQUIS) tablet 5 mg  5 mg Oral BID Porter Cody MD        pantoprazole (PROTONIX) tablet 40 mg  40 mg Oral QAM AC RAJAT Parrish - CNP   40 mg at 09/22/21 0612    heparin (porcine) injection 7,980 Units  80 Units/kg IntraVENous PRN Chaz E Volino, DO        heparin (porcine) injection 3,990 Units  40 Units/kg IntraVENous PRN Chaz E Volino, DO           PRN Medications  acetaminophen **OR** acetaminophen, guaiFENesin-dextromethorphan, sodium chloride flush, sodium chloride, potassium chloride **OR** potassium alternative oral replacement **OR** potassium chloride, ondansetron **OR** ondansetron, senna, perflutren lipid microspheres, heparin (porcine), heparin (porcine)    Objective  Most Recent Recorded Vitals  BP (!) 153/71   Pulse 53   Temp 94.2 °F (34.6 °C) (Oral)   Resp 20   Ht 5' 6\" (1.676 m)   Wt 218 lb 6.4 oz (99.1 kg)   SpO2 93%   BMI 35.25 kg/m²   I/O last 3 completed shifts: In: 480 [P.O.:480]  Out: 350 [Urine:350]  No intake/output data recorded. Physical Exam:  General: AAO to person/place/time/purpose, NAD, no labored breath, appears very weak ing  Eyes: conjunctivae/corneas clear, sclera non icteric  Skin: color/texture/turgor normal, no rashes or lesions  Lungs: diminished but CTAB, no retractions/use of accessory muscles, no vocal fremitus, no rhonchi, no crackle, no rales  Heart: regular rate, regular rhythm, no murmur  Abdomen: soft, NT, bowel sounds normal  Extremities: atraumatic, no edema  Neurologic: cranial nerves 2-12 grossly intact, no slurred speech    Most Recent Labs  Lab Results   Component Value Date    WBC 6.6 09/22/2021    HGB 12.6 09/22/2021    HCT 38.7 09/22/2021     (L) 09/22/2021     09/22/2021    K 4.2 09/22/2021     (H) 09/22/2021    CREATININE 0.8 09/22/2021    BUN 21 09/22/2021    CO2 19 (L) 09/22/2021    GLUCOSE 195 (H) 09/22/2021    ALT 33 09/22/2021    AST 44 (H) 09/22/2021    INR 1.7 09/22/2021       US DUP LOWER EXTREMITIES BILATERAL VENOUS   Final Result   Nonocclusive DVT extending from the right distal common femoral to proximal   superficial femoral vein. Critical results were called by Dr. Lev Berger to  Blanca Poster on   9/21/2021 at 20:04. CTA PULMONARY W CONTRAST   Final Result   Multifocal pulmonary artery filling defects most compatible with bilateral   PE. No definite CT evidence of right heart strain.       Multifocal bilateral pulmonary infiltrates may correspond with history of   COVID pneumonia

## 2021-09-22 NOTE — PROGRESS NOTES
Vanessa Lauren M.D.,[facility]  Araceli Smith D.O., F.A.C.O.I., Tito Gonzalez M.D. Pham Jane M.D. Nestor Matias D.O. Daily Pulmonary Progress Note    Patient:  Alok Greer 78 y.o. male MRN: [id number]     Date of Service: 9/22/2021      Synopsis     We are following patient for pulmonary embolism    \"CC\" shortness of breath, cough    Code status: FULL      Subjective      Patient was seen through window. Discussed care with bedside RN. Per RN, she found patient to have oxygen off and O2 sat of 88%, he also had pulled an IV out and is confused today requiring a sitter for safety. He is unable to walk at this time, unable to perform ambulatory pulse ox test. Patient 93% and requiring 4L NC at this time. Continue to wean to keep SpO2 >90%. Per bedside nurse, patient coughing with food and she is concerned for aspiration.  Will order speech eval.       Review of Systems:  Unable to obtain d/t mentation    24-hour events:  Confused, requiring oxygen and sitter    Objective   Vitals: BP (!) 153/71   Pulse 53   Temp 94.2 °F (34.6 °C) (Oral)   Resp 20   Ht 5' 6\" (1.676 m)   Wt 218 lb 6.4 oz (99.1 kg)   SpO2 93%   BMI 35.25 kg/m²     I/O:    Intake/Output Summary (Last 24 hours) at 9/22/2021 1110  Last data filed at 9/21/2021 1721  Gross per 24 hour   Intake 240 ml   Output 125 ml   Net 115 ml       Vent Information  SpO2: 93 %    CURRENT MEDS :  Scheduled Meds:   Vitamin D  2,000 Units Oral Daily    sodium chloride flush  10 mL IntraVENous 2 times per day    zinc sulfate  50 mg Oral Daily    thiamine  100 mg Oral Daily    ascorbic acid  500 mg Oral Daily    aspirin  81 mg Oral Nightly    metoprolol tartrate  25 mg Oral BID    rosuvastatin  20 mg Oral Nightly    apixaban  10 mg Oral BID    Followed by   Jeremy Conti ON 9/28/2021] apixaban  5 mg Oral BID    pantoprazole  40 mg Oral QAM AC       Physical Exam:  Due to the current efforts to prevent transmission of COVID-19 and also the need to preserve PPE for other caregivers, a face-to-face encounter with the patient was not performed. That being said, all relevant records and diagnostic tests were reviewed, including laboratory results and imaging. Please reference any relevant documentation elsewhere. Care will be coordinated with the primary service. Pertinent/ New Labs and Imaging Studies     Imaging Personally Reviewed:  CXR 9/20  Moderate degenerative shoulder arthropathy is present bilaterally.  The   patient is post sternotomy.       The heart and mediastinum are normal for AP technique.       Mild bibasilar consolidation, may reflect atelectasis or pneumonia.  The   remaining lung parenchyma has normal aeration and architecture. CTA pulmonary 9/20  Multifocal pulmonary artery filling defects most compatible with bilateral   PE.  No definite CT evidence of right heart strain.       Multifocal bilateral pulmonary infiltrates may correspond with history of   COVID pneumonia       Partially imaged simple appearing cyst in the abdomen may be exophytic from   the right kidney anteriorly       US BLE 9/21  Nonocclusive DVT extending from the right distal common femoral to proximal   superficial femoral vein.        ECHO pending      Labs:  Lab Results   Component Value Date    WBC 6.6 09/22/2021    HGB 12.6 09/22/2021    HCT 38.7 09/22/2021    MCV 93.0 09/22/2021    MCH 30.3 09/22/2021    MCHC 32.6 09/22/2021    RDW 14.8 09/22/2021     09/22/2021    MPV 10.4 09/22/2021     Lab Results   Component Value Date     09/22/2021    K 4.2 09/22/2021     09/22/2021    CO2 19 09/22/2021    BUN 21 09/22/2021    CREATININE 0.8 09/22/2021    LABALBU 2.9 09/22/2021    CALCIUM 7.5 09/22/2021    GFRAA >60 09/22/2021    LABGLOM >60 09/22/2021     Lab Results   Component Value Date    PROTIME 18.9 09/22/2021    INR 1.7 09/22/2021     Recent Labs     09/20/21  1433   PROBNP 300     Recent Labs     09/20/21  1433   PROCAL 0.08     This [place] has not been configured with any valid records. Micro:  9/20 blood cultures negative  9/18 COVID (-)  No results for input(s): CULTRESP in the last 72 hours. No results for input(s): LABGRAM in the last 72 hours. No results for input(s): LEGUR in the last 72 hours. No results for input(s): STREPNEUMAGU in the last 72 hours. No results for input(s): LP1UAG in the last 72 hours. Assessment:    1. Mild to moderate SARS CoV 2 pneumonia   2. Acute small bilateral Pulmonary Embolism -likely provoked from recent SARS CoV 2 infection  3. CAD, Hx CABG  4. HTN  5. Lifelong non smoker  6. Acute delirium       Plan:   1. Wean oxygen as tolerated to keep SpO2 >90%, on 4 L NC  2. Out of window for remdesivir  3. Continue vitamins. 4. Decadron stopped per PCP  5. Speech consult ordered for possible aspiration  6. Inflammatory markers stable   7. Sitter at bedside d/t acute delirium for patient safety   8. Eliquis with loading dose 10 mg po bid x 7 days then decrease to 5 mg po bid. Recommend tx for 3 months. 9. Gentle hydration with NS @ 75cc/hr per PCP  10. Echo ordered-pending. No evidence of RV strain on CT chest. Small non occlusive filling defects. Low pro bnp, low troponin. 11. US BLE positive for nonocclusive right femoral  12. Incentive spirometer   13. GI prophylaxis-protonix  14. PT/OT evaluation ordered  15. Patient will need SNF upon discharge       This plan of care was reviewed in collaboration with Dr. Nikhil Marks  Electronically signed by RAJAT Hoffman CNP on 9/22/2021 at 11:10 AM     Addendum:  I personally  cared for the patient. Labs, medications, radiographs reviewed. Patient has become hypoxic overnight requiring 4 L of oxygen saturating 90%. Order chest x-ray today. Obtain ABG. Start him on baricitinib pharmacy just consulted. Continue Decadron 10 mg daily. Patient on full anticoagulation due to PE and DVTs.      I agree with history exam and plans detailed in NP note.  Zoey Epperson MD

## 2021-09-23 NOTE — PROGRESS NOTES
SPEECH/LANGUAGE PATHOLOGY  VIDEOFLUOROSCOPIC STUDY OF SWALLOWING (MBS)   and PLAN OF CARE    PATIENT NAME:  Marcin Valencia  (male)     MRN:  [id number]    :  1940  (78 y.o.)  STATUS:  Inpatient: Room 0437/0437-A    TODAY'S DATE:  2021  REFERRING PROVIDER:   Carmita Meyer MD   SPECIFIC PROVIDER ORDER: FL modified barium swallow with video  Date of order:  21    REASON FOR REFERRAL: further assess pharyngeal swallow   EVALUATING THERAPIST: YURI Molina      RESULTS:      DYSPHAGIA DIAGNOSIS:  mild-moderate oropharyngeal phase dysphagia     Laryngeal Penetration and Aspiration:  Penetration WITHOUT aspiration was observed in today's study with  thin liquid, however penetration was deep a places pt at risk of aspiration     DIET RECOMMENDATIONS:  Soft and bite size consistency solids (dysphagia 3) with  nectar consistency (mildly thick) liquids as tolerated    FEEDING RECOMMENDATIONS:    Assistance level:  No assistance needed     Compensatory strategies recommended: Small bites/sips and Alternate solids and liquids     Discussed recommendations with nursing and/or faxed report to referring provider: Yes    SPEECH THERAPY  PLAN OF CARE   The dysphagia POC is established based on physician order and dysphagia diagnosis    Skilled SLP intervention for dysphagia management on acute care 3-5 x per week until goals met, pt plateaus in function and/or discharged from hospital      Conditions Requiring Skilled Therapeutic Intervention for dysphagia:    Reduced oral control of bolus   reduced laryngeal closure resulting in penetration  swallow triggered when bolus head at level of laryngeal surface of epiglottis increasing risk of aspiration    SPECIFIC DYSPHAGIA INTERVENTIONS TO INCLUDE:     Compensatory strategy training   Therapeutic exercises    Specific instructions for next treatment:  ongoing PO analysis to upgrade diet and evaluate tolerance of current PO recommendation, initiate instruction of therapeutic exercises  and initiate instruction of compensatory strategies  Treatment Goals:    Short Term Goals:  Pt will implement identified compensatory swallowing strategies on 90% of opportunities or greater to improve airway protection and swallow function. Pt will complete PO trials of upgraded diet textures with SLP only to determine the least restrictive PO diet to maintain adequate nutrition/hydration with no more than 1 overt s/s of pen/asp. Pt will complete oral motor strength/ coordination exercises to improve bolus prep/ control and mastication with  minimal verbal prompts . Pt will complete laryngeal strength/ ROM therapeutic exercises to improve airway protection for the least restrictive PO diet minimal verbal prompts    Long Term Goals:   Pt will maintain adequate nutrition/hydration via PO intake of the least restrictive oral diet with implementation of safe swallow/ compensatory strategies and decrease signs/symptoms of aspiration to less than 1 x/day. Pt will improve oropharyngeal swallow function to ensure airway protection during PO intake to maintain adequate nutrition/hydration and decrease signs/symptoms of aspiration to less than 1 x/day.       Patient/family Goal:    To be able to eat regular foods and drink regular liquids    Plan of care discussed with Patient   The Patient understand(s) the diagnosis, prognosis and plan of care     Rehabilitation Potential/Prognosis: fair                      ADMITTING DIAGNOSIS: Bilateral pulmonary embolism (HCC) [I26.99]  [facility] [J96.01]  Pneumonia due to COVID-19 virus [U07.1, J12.82]     VISIT DIAGNOSIS:   Visit Diagnoses       Codes    Bilateral pulmonary embolism (HCC)     I26.99              PATIENT REPORT/COMPLAINT: pt denies difficulty swallowing    PRIOR LEVEL OF SWALLOW FUNCTION:    Past History of Dysphagia?:  none reported    Home diet: Regular consistency solids with  thin cervical esophagus appeared adequate          ___________    Cognition:   Alert & Oriented x 2    Oral Peripheral Examination   Generalized oral weakness    Current Respiratory Status   6 liters nasal cannula     Parameters of Speech Production  Respiration:  Adequate for speech production  Quality:   Within functional limits  Intensity: Within functional limits    Pain: No pain reported. EDUCATION:   The Speech Language Pathologist (SLP) completed education regarding results of evaluation and that intervention is warranted at this time. Learner: Patient  Education: Reviewed results and recommendations of this evaluation  Evaluation of Education:  Mari understanding    This plan may be re-evaluated and revised as warranted. Evaluation Time includes thorough review of current medical information, gathering information on past medical history/social history and prior level of function, completion of standardized testing/informal observation of tasks, assessment of data and education on plan of care and goals. INTERVENTION    Pt/family educated on above results and plan of care. Pt/family trained on compensatory strategies for safe swallow and signs and symptoms of aspiration (throat clearing, coughing/choking, wet vocal quality. , etc.) and encouraged to notify staff if observed. Handouts provided as warranted. Pt/family encouraged to engage in question/answer session. All questions answered and pt/family verbalized understanding of above. [x]The admitting diagnosis and active problem list, have been reviewed prior to initiation of this evaluation.     CPT Code: 42602  dysphagia study, 37599 dysphagia therapy    ACTIVE PROBLEM LIST:   Patient Active Problem List   Diagnosis    Acute respiratory failure with hypoxia (HCC)    Pneumonia due to COVID-19 virus    Pulmonary embolism, bilateral ([facility]    Hyperlipidemia    Hypertension    Dehydration    CAD (coronary artery disease)       Linn Gupta Alisa SOTO CCC/SLP [id number]  Speech-Language Pathologist

## 2021-09-23 NOTE — CARE COORDINATION
Social Work/Discharge Planning:  Social Work consult for [facility]. Called liaisons for [facility] and [facility] and confirmed patient insurance typically does not approve for an LTAC. Chart reviewed. Patient currently on 6 liters of oxygen. He has a sitter. Called liaison Isabel with [address] [place] [address] and facility will continue to follow pending bed availability. Isabel confirmed patient will need a pre-cert BUT patient will need to be sitter free for 24 hours. Referral made to Reina Arango with [facility] and facility will review patient information. Provided update to patient wife Akash Pack (ph: [phone]). Will continue to follow.   Electronically signed by CLEVELAND Roth on 9/23/2021 at 11:56 AM

## 2021-09-23 NOTE — PROGRESS NOTES
RISHABH Luaino, DO        potassium chloride (KLOR-CON M) extended release tablet 40 mEq  40 mEq Oral PRN Chaz Bowman, DO        Or    potassium bicarb-citric acid (EFFER-K) effervescent tablet 40 mEq  40 mEq Oral PRN Chaz Luaino, DO        Or    potassium chloride 10 mEq/100 mL IVPB (Peripheral Line)  10 mEq IntraVENous PRN Chaz Luaino, DO        ondansetron (ZOFRAN-ODT) disintegrating tablet 4 mg  4 mg Oral Q8H PRN Chaz Bowman, DO        Or    ondansetron (ZOFRAN) injection 4 mg  4 mg IntraVENous Q6H PRN Chaz Bowman, DO        senna (SENOKOT) tablet 8.6 mg  1 tablet Oral Daily PRN Chaz Bowman, DO        0.9 % sodium chloride infusion   IntraVENous Continuous Chaz Bowman, DO 75 mL/hr at 09/22/21 2130 New Bag at 09/22/21 2130    perflutren lipid microspheres (DEFINITY) injection 1.65 mg  1.5 mL IntraVENous ONCE PRN Chaz Bowman, DO        zinc sulfate (ZINCATE) capsule 50 mg  50 mg Oral Daily Chaz Bowman, DO   50 mg at 09/22/21 0857    thiamine tablet 100 mg  100 mg Oral Daily Chaz Bowman, DO   100 mg at 09/22/21 0857    aspirin chewable tablet 81 mg  81 mg Oral Nightly Chaz Bowman, DO   81 mg at 09/22/21 2131    metoprolol tartrate (LOPRESSOR) tablet 25 mg  25 mg Oral BID Chaz Bowman, DO   25 mg at 09/21/21 2134    rosuvastatin (CRESTOR) tablet 20 mg  20 mg Oral Nightly Chaz Bowman, DO   20 mg at 09/22/21 2132    apixaban (ELIQUIS) tablet 10 mg  10 mg Oral BID Porter Cody MD   10 mg at 09/22/21 2131    Followed by   Marco Cheney ON 9/28/2021] apixaban (ELIQUIS) tablet 5 mg  5 mg Oral BID Porter Cody MD        pantoprazole (PROTONIX) tablet 40 mg  40 mg Oral QAM AC RAJAT Parrish - CNP   40 mg at 09/23/21 0512    heparin (porcine) injection 7,980 Units  80 Units/kg IntraVENous PRN Chaz Bowman, DO        heparin (porcine) injection 3,990 Units  40 Units/kg IntraVENous PRN Chaz Bowman, DO           PRN Medications  acetaminophen **OR** acetaminophen, guaiFENesin-dextromethorphan, sodium chloride flush, sodium chloride, potassium chloride **OR** potassium alternative oral replacement **OR** potassium chloride, ondansetron **OR** ondansetron, senna, perflutren lipid microspheres, heparin (porcine), heparin (porcine)    Objective  Most Recent Recorded Vitals  BP (!) 148/68   Pulse 54   Temp 97.6 °F (36.4 °C) (Oral)   Resp 20   Ht 5' 6\" (1.676 m)   Wt 219 lb 1.6 oz (99.4 kg)   SpO2 96%   BMI 35.36 kg/m²   No intake/output data recorded. No intake/output data recorded. Physical Exam:  General: AAO to person/place/time/purpose, NAD, no labored breath, appears very weak ing  Eyes: conjunctivae/corneas clear, sclera non icteric  Skin: color/texture/turgor normal, no rashes or lesions  Lungs: diminished but CTAB, no retractions/use of accessory muscles, no vocal fremitus, no rhonchi, no crackle, no rales  Heart: regular rate, regular rhythm, no murmur  Abdomen: soft, NT, bowel sounds normal  Extremities: atraumatic, no edema  Neurologic: cranial nerves 2-12 grossly intact, no slurred speech    Most Recent Labs  Lab Results   Component Value Date    WBC 5.4 09/23/2021    HGB 12.6 09/23/2021    HCT 38.1 09/23/2021     09/23/2021     09/23/2021    K 4.4 09/23/2021     (H) 09/23/2021    CREATININE 0.8 09/23/2021    BUN 18 09/23/2021    CO2 23 09/23/2021    GLUCOSE 203 (H) 09/23/2021    ALT 28 09/23/2021    AST 33 09/23/2021    INR 1.8 09/23/2021       XR CHEST PORTABLE   Final Result   Redemonstration of findings suggestive of bilateral pneumonia increasing in   mid left lung field and left lung base. US DUP LOWER EXTREMITIES BILATERAL VENOUS   Final Result   Nonocclusive DVT extending from the right distal common femoral to proximal   superficial femoral vein. Critical results were called by Dr. Tj Fowler to ERICA Mcleod on   9/21/2021 at 20:04.          CTA PULMONARY W CONTRAST   Final Result   Multifocal pulmonary artery filling defects most compatible with bilateral   PE. No definite CT evidence of right heart strain. Multifocal bilateral pulmonary infiltrates may correspond with history of   COVID pneumonia      Partially imaged simple appearing cyst in the abdomen may be exophytic from   the right kidney anteriorly      Critical results were called by Dr. Pradip Dudley to Dr. Quinn Hinojosa  on   9/20/2021 at 4:37 p.m. Eastern         XR CHEST PORTABLE   Final Result   Mild-moderate bibasilar consolidation, favor atelectasis over pneumonia. Echocardiogram 9/21/21  Pending report     Assessment   Active Hospital Problems    Diagnosis     Pneumonia due to COVID-19 virus [U07.1, J12.82]      Priority: High    Dehydration [E86.0]      Priority: Medium    Pulmonary embolism, bilateral (HCC) [I26.99]      Priority: Medium    Hyperlipidemia [E78.5]     Hypertension [I10]     CAD (coronary artery disease) [I25.10]     Acute respiratory failure with hypoxia ([address] [J96.01]          Plan  · COVID-19 viral pneumonia w/ associated sepsis:   · Contact/droplet isolation. · Vaccinated with J&J March 2020  · Out of window for Remdesivir. · Decadron 10mg IV QD started 9/22  · Baricitinib started 9/22   · Vitamin protocol  · Follow inflammatory markers  · Wean NCO2 as able  · PE: CTA chest noted. Eliquis. Echo pending report. Pulmonology following  · Aspiration PNA? ? · Video swallow ordered  · Speech for recommendations   · CXR worsening on L side - reviewed   · Check CXR in AM, check procal in AM  · Start Unasyn 9/23  · Dehydration: Monitor fluid status - IVF stopped today   · Hypothermia: Bear hugger  · ? Parkinson's disease: OP neurology f/u  · Weakness: PT AM PAC 16/24  · Follow labs  · DVT prophylaxis  · Please see orders for further management and care.   ·  for discharge planning  · Discharge plan: home w/ [facility] vs SNF     Electronically signed by Rory Delgado MD on 9/23/2021 at 8:01 AM    I can be reached through [employer].

## 2021-09-23 NOTE — CONSULTS
Palliative Care Department  Palliative Care Initial Consult  Provider: Piedad Lorenzo, RAJAT - CNP  [phone]    Hospital Day: 4  Date of Initial Consult: 9/23/2021  Referring Provider: Calin Chaves  Palliative Medicine was consulted for assistance with: Code status Discussion and Assist with goals of care    Chief Complaint: Tommy Vang is a 78 y.o. male with chief complaint of SOB    HPI:   Tommy Vang is a 78 y.o. male with significant medical history of CAD, HTN, HLD, diagnosed with COVID pneumonia prior to presentation, testing + on 9/18/21, who was admitted on 9/20/2021 after presenting with worsening SOB. He had reportedly no been feeling well for the past 3 weeks. He was additionally found to have a PE and was admitted, started on anticoagulation and with pulmonology consultation. Palliative was consulted to address goals of care and code status. ASSESSMENT/PLAN:     Pertinent Hospital Diagnoses:  Current medical issues leading to Palliative Medicine involvement include   Active Hospital Problems    Diagnosis Date Noted    Pneumonia due to COVID-19 virus [U07.1, J12.82] 09/20/2021     Priority: High    Dehydration [E86.0] 09/21/2021     Priority: Medium    Pulmonary embolism, bilateral ([address] [I26.99] 09/20/2021     Priority: Medium    Hyperlipidemia [E78.5]     Hypertension [I10]     CAD (coronary artery disease) [I25.10]     Acute respiratory failure with hypoxia ([address] [J96.01] 09/20/2021     Palliative Care Encounter / Counseling Regarding Goals of Care:  Please see detailed goals of care discussion as below.  At this time, Tommy Vang, Does Not have capacity for medical decision-making. Capacity is time limited and situation/question specific.    During encounter the patient's wife was surrogate medical decision-maker   Outcome of goals of care meeting: continue current management   Code status: DNR-CCA   Advanced Directives: Living Will and HC-POA   Surrogate/Legal Luz West ([phone]) is the patient's wife and healthcare power of     Referrals: none today    SUBJECTIVE:   Events/Discussions:  09/23/21  Chart reviewed, it is noted that an ACP documentation was completed on 9/20/2021 while the patient was in the emergency department, and as per that documentation patient expressed they would be agreeable to mechanical ventilation for acute illness, but would not want mechanical ventilation if his recovery are unlikely and he would likely require long-term ventilation. The documentation also indicates that the patient expressed he would not wish to be resuscitated in the event of cardiac arrest.  I spoke with the patient's wife by telephone we discussed the patient's known wishes regarding medical care and resuscitation. She confirms that the patient would be agreeable to mechanical ventilation for acute illness but would not wish to be placed on life support solely to prolong life and would not wish to have long-term life support. She also confirms that the patient would not wish to be resuscitated in the event of cardiac arrest and is agreeable to CODE STATUS of DNR CCA. Past Medical History:   Diagnosis Date    CAD (coronary artery disease)     Hyperlipidemia     Hypertension        Past Surgical History:   Procedure Laterality Date    CORONARY ARTERY BYPASS GRAFT      TONSILLECTOMY         Family History   Problem Relation Age of Onset    Diabetes Mother     Heart Disease Mother     Other Brother         plane crash    Obesity Brother        No Known Allergies    ROS: unable to obtain    OBJECTIVE:   Prognosis: unknown    Physical Exam:  BP (!) 170/67   Pulse 56   Temp 96.5 °F (35.8 °C) (Temporal)   Resp 20   Ht 5' 6\" (1.676 m)   Wt 219 lb 1.6 oz (99.4 kg)   SpO2 94%   BMI 35.36 kg/m²     To decrease potential spread of COVID-19 a face-to-face physical exam was deferred.   Please refer to attending note for detailed physical assessment. Objective data reviewed: labs, images, records, medication use, vitals and chart    Time/Communication:  Greater than 50% of time spent, total 30 minutes in counseling and coordination of care at the bedside regarding goals of care and see above. RAJAT Day - CNP  Palliative Medicine    Patient and the plan of care discussed with the other IDT members of Palliative Care Team, and with consultants, Primary Attending, patient, family and floor nurse, as appropriate and available. Thank you for allowing Palliative Medicine to participate in the care of Eric Jones. Note: This report was completed using computer[employer] voiced recognition software. Every effort has been made to ensure accuracy; however, inadvertent computerized transcription errors may be present.

## 2021-09-23 NOTE — PROGRESS NOTES
Rozina Everett M.D.,[facility]  Jesse Allred D.O., F.A.C.O.I., Elsa Curry M.D. Eileen Jay M.D. Ana Grady D.O. Daily Pulmonary Progress Note    Patient:  Dann Cowan 78 y.o. male MRN: [id number]     Date of Service: 9/23/2021      Synopsis     We are following patient for pulmonary embolism    \"CC\" shortness of breath, cough    Code status: FULL      Subjective      Patient was seen and examined. Sitter watching patient from outside the room. Patient lying in bed on 6 L nasal cannula without distress. Per sitter patient is to go for a barium swallow today. He was started on thickened liquids yesterday per speech therapy. Patient answers all questions appropriately today. Per sitter patient has not tried to get out of bed by himself or pull out any IVs today. Patient seems much more stable and cooperative today. He states he has been using his incentive spirometer and continues to use it while I am in the room. Review of Systems:  Constitutional: Denies fever, chills, headache  HENT: Denies congestion, ear pain, sinus pressure and sore throat.    Eyes: Negative for pain. Respiratory: Positive for cough and shortness of breath with exertion. Negative for wheezing.    Cardiovascular: Negative for chest pain. Gastrointestinal: Negative for abdominal pain, diarrhea, nausea and vomiting. Genitourinary: Negative for dysuria and frequency. Musculoskeletal: Positive for arthralgias and myalgias. Negative for back pain. Skin: Negative for rash and wound. Neurological: Denies headache, weakness.    Hematological: Negative for adenopathy.   All other systems reviewed and are negative.       24-hour events:  Patient still requiring a sitter, less confused today, formal barium swallow today    Objective   Vitals: BP (!) 153/69   Pulse 54   Temp 96.6 °F (35.9 °C) (Temporal)   Resp 22   Ht 5' 6\" (1.676 m)   Wt 219 lb 1.6 oz (99.4 kg)   SpO2 91%   BMI 35.36 kg/m² I/O:    Intake/Output Summary (Last 24 hours) at 9/23/2021 1133  Last data filed at 9/23/2021 3068  Gross per 24 hour   Intake 360 ml   Output 450 ml   Net -90 ml       Vent Information  SpO2: 91 %    CURRENT MEDS :  Scheduled Meds:   dexamethasone  10 mg IntraVENous Q24H    ascorbic acid  1,000 mg Oral Daily    baricitinib  4 mg Oral Daily    Vitamin D  2,000 Units Oral Daily    sodium chloride flush  10 mL IntraVENous 2 times per day    zinc sulfate  50 mg Oral Daily    thiamine  100 mg Oral Daily    aspirin  81 mg Oral Nightly    metoprolol tartrate  25 mg Oral BID    rosuvastatin  20 mg Oral Nightly    apixaban  10 mg Oral BID    Followed by   Tania Aragon ON 9/28/2021] apixaban  5 mg Oral BID    pantoprazole  40 mg Oral QAM AC       Physical Exam:  General: The patient is lying in bed comfortably without any distress. Breathing is not labored  HEENT: Pupils are equal round and reactive to light, there are no oral lesions and no post-nasal drip   Neck: supple without adenopathy  Cardiovascular: regular rate and rhythm without murmur or gallop  Respiratory: diminished to auscultation bilaterally without wheezing or crackles. Air entry is symmetric  Abdomen: soft, non-tender, non-distended, normal bowel sounds  Extremities: warm, no clubbing. (+) generalized edema  Skin: no rash or lesion  Neurologic: CN II-XII grossly intact, no focal deficits. Alert and oriented     Pertinent/ New Labs and Imaging Studies     Imaging Personally Reviewed:  CXR 9/22  There are median sternotomy wires.  Mild cardiomegaly.  Patchy hazy and   interstitial opacities bilaterally notable in lung bases.  Opacities appear   to have increased in mid left lung field and left lung base.        CTA pulmonary 9/20  Multifocal pulmonary artery filling defects most compatible with bilateral   PE.  No definite CT evidence of right heart strain.       Multifocal bilateral pulmonary infiltrates may correspond with history of   COVID pneumonia       Partially imaged simple appearing cyst in the abdomen may be exophytic from   the right kidney anteriorly       US BLE 9/21  Nonocclusive DVT extending from the right distal common femoral to proximal   superficial femoral vein. ECHO pending    Labs:  Lab Results   Component Value Date    WBC 5.4 09/23/2021    HGB 12.6 09/23/2021    HCT 38.1 09/23/2021    MCV 92.5 09/23/2021    MCH 30.6 09/23/2021    MCHC 33.1 09/23/2021    RDW 14.6 09/23/2021     09/23/2021    MPV 9.7 09/23/2021     Lab Results   Component Value Date     09/23/2021    K 4.4 09/23/2021     09/23/2021    CO2 23 09/23/2021    BUN 18 09/23/2021    CREATININE 0.8 09/23/2021    LABALBU 2.7 09/23/2021    CALCIUM 7.5 09/23/2021    GFRAA >60 09/23/2021    LABGLOM >60 09/23/2021     Lab Results   Component Value Date    PROTIME 19.4 09/23/2021    INR 1.8 09/23/2021     Recent Labs     09/20/21  1433   PROBNP 300     Recent Labs     09/20/21  1433   PROCAL 0.08     This SmartLink has not been configured with any valid records. Micro:  9/20 blood cultures negative  9/18 COVID (-)  No results for input(s): CULTRESP in the last 72 hours. No results for input(s): LABGRAM in the last 72 hours. No results for input(s): LEGUR in the last 72 hours. No results for input(s): STREPNEUMAGU in the last 72 hours. No results for input(s): LP1UAG in the last 72 hours. CRP 2.1->3.8  D Dimer  1650->865  Sed rate 6  Procal .08  Results for Usama Beard (MRN [id number]) as of 9/23/2021 11:36   Ref.  Range 9/22/2021 14:30   Source: Unknown Blood Arterial   pH, Blood Gas Latest Ref Range: 7.350 - 7.450  7.491 (H)   PCO2 Latest Ref Range: 35.0 - 45.0 mmHg 27.3 (L)   pO2 Latest Ref Range: 75.0 - 100.0 mmHg 68.1 (L)   HCO3 Latest Ref Range: 22.0 - 26.0 mmol/L 20.4 (L)   Base Excess Latest Ref Range: -3.0 - 3.0 mmol/L -1.6   O2 Sat Latest Ref Range: 92.0 - 98.5 % 94.1   tHb (est) Latest Ref Range: 11.5 - 16.5 g/dL 13.4   O2Hb Latest Ref Range: 94.0 - 97.0 % 93.4 (L)   COHb Latest Ref Range: 0.0 - 1.5 % 0.6   MetHb Latest Ref Range: 0.0 - 1.5 % 0.1   HHb Latest Ref Range: 0.0 - 5.0 % 5.9 (H)   O2 Content Latest Units: mL/dL 17.6   Pt Temp Latest Units: C 37.0   Critical(s) Notified Unknown . No Critical Values   Time Analyzed Unknown 1430   Mode Unknown NC- 4  L        Assessment:    1. Mild to moderate SARS CoV 2 pneumonia   2. Acute small bilateral Pulmonary Embolism -likely provoked from recent SARS CoV 2 infection  3. CAD, Hx CABG  4. HTN  5. Parkinson's   6. Lifelong non smoker  7. Acute delirium       Plan:   1. Wean oxygen as tolerated to keep SpO2 >90%, on 4 L NC  2. Out of window for remdesivir  3. Continue vitamins. 4. Decadron 10 mg IV daily started 9/22, Christophe 4 mg started 9/22  5. Speech input reviewed and appreciated, thickened liquids and patient to have barium swallow today  6. ABGs reviewed from last night   7. Inflammatory markers stable, continue to monitor  8. Sitter remains at bedside for now, may be stopped this afternoon if patient remains oriented and safe   9. Eliquis with loading dose 10 mg po bid x 7 days then decrease to 5 mg po bid. Recommend tx for 3 months. 10. Echo ordered-pending. No evidence of RV strain on CT chest. Small non occlusive filling defects. Low pro bnp, low troponin. 11. Incentive spirometer   12. GI prophylaxis-protonix  13. PT/OT evaluation ordered  14. Patient will need SNF upon discharge       This plan of care was reviewed in collaboration with Dr. Lenwood Merlin  Electronically signed by RAJAT Elliott CNP on 9/23/2021 at 11:33 AM     I personally saw, examined and provided care for the patient. Radiographs, labs and medication list were reviewed by me independently. The case was discussed in detail and plans for care were established. Review of CNP documentation was conducted and revisions were made as appropriate.  I agree with the above documented exam, problem list and plan of care.  Christina Whelan MD

## 2021-09-24 NOTE — DISCHARGE INSTR - COC
Continuity of Care Form    Patient Name: Michelle Matias   :  1940  MRN:  [id number]    Admit date:  2021  Discharge date:  21    Code Status Order: DNR-CCA   Advance Directives:     Admitting Physician:  Lorie Runner, DO  PCP: No primary care provider on file.     Discharging Nurse: [facility] Unit/Room#: 9747/0668-J  Discharging Unit Phone Number: [phone]    Emergency Contact:   Extended Emergency Contact Information  Primary Emergency Contact: SKYLER VENTURA  Address: [address] [address], [address] [place] [address] Phone: [phone]  Mobile Phone: [phone]  Relation: Spouse  Preferred language: English  Secondary Emergency Contact: Aldair Lund Phone: [phone]  Mobile Phone: [phone]  Relation: Child  Preferred language: English    Past Surgical History:  Past Surgical History:   Procedure Laterality Date    CORONARY ARTERY BYPASS GRAFT      TONSILLECTOMY         Immunization History:   Immunization History   Administered Date(s) Administered    COVID-19, J&J, PF, 0.5 mL 2021       Active Problems:  Patient Active Problem List   Diagnosis Code    Acute respiratory failure with hypoxia ([facility] J96.01    Pneumonia due to COVID-19 virus U07.1, J12.82    Pulmonary embolism, bilateral (HCC) I26.99    Hyperlipidemia E78.5    Hypertension I10    Dehydration E86.0    CAD (coronary artery disease) I25.10       Isolation/Infection:   Isolation          Droplet Plus        Patient Infection Status     Infection Onset Added Last Indicated Last Indicated By Review Planned Expiration Resolved Resolved By    COVID-19 21 COVID-19 09/28/21 10/02/21      Detected 2021 from outside lab          Nurse Assessment:  Last Vital Signs: BP (!) 162/74   Pulse 58   Temp 97.6 °F (36.4 °C) (Axillary)   Resp 26   Ht 5' 6\" (1.676 m)   Wt 219 lb 1.6 oz (99.4 kg)   SpO2 91%   BMI 35.36 kg/m²     Last documented Discharging to Facility/ Agency   · Name: Stanley Delaney   · [facility] [place]  · Phone: [phone]  · Fax: [phone]    Dialysis Facility (if applicable)   · Name:  · Address:  · Dialysis Schedule:  · Phone:  · Fax:    / signature: Electronically signed by CLEVELAND Arambula on 9/24/21 at 3:58 PM EDT    PHYSICIAN SECTION    Prognosis: {Prognosis:9297969667}    Condition at Discharge: [address] Martin Benitez Chidi Patient Condition:[id number]}    Rehab Potential (if transferring to Rehab): {Prognosis:0212978136}    Recommended Labs or Other Treatments After Discharge: ***    Physician Certification: I certify the above information and transfer of Jenn Akers  is necessary for the continuing treatment of the diagnosis listed and that he requires [facility] for less 30 days.      Update Admission H&P: {CHP DME Changes in LVNBT:[id number]}    PHYSICIAN SIGNATURE:  {Esignature:[id number]}

## 2021-09-24 NOTE — PROGRESS NOTES
Chart reviewed no acute events today or overnight. Received update from bedside nurse. Patient continues to have some confusion and has been trying to get out of bed. No PM needs at this time. Will continue to follow.

## 2021-09-24 NOTE — CARE COORDINATION
Social Work/Discharge Planning:  Chart reviewed. Patient on 6 liters of oxygen. Requested updated therapy notes. Left message for Isabel with [address] [place] [address] to confirm if facility has a bed available for patient. Left message for Reina Arango with [facility] to confirm if facility can accept. Will continue to follow.   Electronically signed by CLEVELAND Roth on 9/24/2021 at 9:16 AM

## 2021-09-24 NOTE — FLOWSHEET NOTE
1130: Patient's bed alarm going off. Patient at side of bed without nasal cannula. Patient SOB and working to breathe. Placed patient's nasal cannula back and assisted him back into bed. 81-83% on RA. Patient took a long tie to recover on the 6 LNC finally reaching 92% after approximately one half hour.

## 2021-09-24 NOTE — PROGRESS NOTES
Rozina Everett M.D.,[facility]  Jesse Allred D.O., F.A.C.O.I., Elsa Curry M.D. Eileen Jay M.D. Ana Grady D.O. Daily Pulmonary Progress Note    Patient:  Dann Cowan 78 y.o. male MRN: [id number]     Date of Service: 9/24/2021      Synopsis     We are following patient for pulmonary embolism    \"CC\" shortness of breath, cough    Code status: DNR-CCA      Subjective      Patient was seen and examined. Telesitter in room. Patient laying in bed on 6L NC without distress. Patient has no complaints at this time. He is still confused but cooperative. Review of Systems:  Constitutional: Denies fever, chills, headache  HENT: Denies congestion, ear pain, sinus pressure and sore throat.    Eyes: Negative for pain. Respiratory: Positive for cough and shortness of breath with exertion. Negative for wheezing.    Cardiovascular: Negative for chest pain. Gastrointestinal: Negative for abdominal pain, diarrhea, nausea and vomiting. Genitourinary: Negative for dysuria and frequency. Musculoskeletal: Positive for arthralgias and myalgias. Negative for back pain. Skin: Negative for rash and wound. Neurological: Denies headache, weakness.    Hematological: Negative for adenopathy.   All other systems reviewed and are negative.       24-hour events:  None     Objective   Vitals: BP (!) 162/74   Pulse 58   Temp 97.6 °F (36.4 °C) (Axillary)   Resp 26   Ht 5' 6\" (1.676 m)   Wt 219 lb 1.6 oz (99.4 kg)   SpO2 91%   BMI 35.36 kg/m²     I/O:    Intake/Output Summary (Last 24 hours) at 9/24/2021 1525  Last data filed at 9/23/2021 1758  Gross per 24 hour   Intake 0 ml   Output 200 ml   Net -200 ml       Vent Information  SpO2: 91 %    CURRENT MEDS :  Scheduled Meds:   albuterol sulfate HFA  2 puff Inhalation 4x daily    insulin lispro  0-6 Units SubCUTAneous TID WC    insulin lispro  0-3 Units SubCUTAneous Nightly    ampicillin-sulbactam  3,000 mg IntraVENous Q6H    dexamethasone  10 mg IntraVENous Q24H    ascorbic acid  1,000 mg Oral Daily    baricitinib  4 mg Oral Daily    Vitamin D  2,000 Units Oral Daily    sodium chloride flush  10 mL IntraVENous 2 times per day    zinc sulfate  50 mg Oral Daily    thiamine  100 mg Oral Daily    aspirin  81 mg Oral Nightly    metoprolol tartrate  25 mg Oral BID    rosuvastatin  20 mg Oral Nightly    apixaban  10 mg Oral BID    Followed by   Alejandra Mooney ON 9/28/2021] apixaban  5 mg Oral BID    pantoprazole  40 mg Oral QAM AC       Physical Exam:  General: The patient is lying in bed comfortably without any distress. Breathing is not labored  HEENT: Pupils are equal round and reactive to light, there are no oral lesions and no post-nasal drip   Neck: supple without adenopathy  Cardiovascular: regular rate and rhythm without murmur or gallop  Respiratory: diminished, coarse to auscultation bilaterally without wheezing or crackles. Air entry is symmetric, moist cough  Abdomen: soft, non-tender, non-distended, normal bowel sounds  Extremities: warm, no clubbing. (+) generalized edema  Skin: no rash or lesion  Neurologic: CN II-XII grossly intact, no focal deficits. Alert and oriented     Pertinent/ New Labs and Imaging Studies     Imaging Personally Reviewed:  CXR 9/24  1. Worsening of the left lower lobe airspace disease when compared to the   prior study   2. Small patchy infiltrate within the right lower lobe. CTA pulmonary 9/20  Multifocal pulmonary artery filling defects most compatible with bilateral   PE.  No definite CT evidence of right heart strain.       Multifocal bilateral pulmonary infiltrates may correspond with history of   COVID pneumonia       Partially imaged simple appearing cyst in the abdomen may be exophytic from   the right kidney anteriorly       US BLE 9/21  Nonocclusive DVT extending from the right distal common femoral to proximal   superficial femoral vein.        ECHO pending    Labs:  Lab Results   Component Value Date    WBC 6.1 09/24/2021    HGB 13.7 09/24/2021    HCT 41.5 09/24/2021    MCV 91.0 09/24/2021    MCH 30.0 09/24/2021    MCHC 33.0 09/24/2021    RDW 14.4 09/24/2021     09/24/2021    MPV 9.6 09/24/2021     Lab Results   Component Value Date     09/24/2021    K 4.4 09/24/2021     09/24/2021    CO2 22 09/24/2021    BUN 19 09/24/2021    CREATININE 0.8 09/24/2021    LABALBU 2.9 09/24/2021    CALCIUM 7.7 09/24/2021    GFRAA >60 09/24/2021    LABGLOM >60 09/24/2021     Lab Results   Component Value Date    PROTIME 17.7 09/24/2021    INR 1.6 09/24/2021     No results for input(s): PROBNP in the last 72 hours. Recent Labs     09/23/21  0515   PROCAL 0.02     This SmartLink has not been configured with any valid records. Micro:  9/20 blood cultures negative  9/18 COVID (-)  No results for input(s): CULTRESP in the last 72 hours. No results for input(s): LABGRAM in the last 72 hours. No results for input(s): LEGUR in the last 72 hours. No results for input(s): STREPNEUMAGU in the last 72 hours. No results for input(s): LP1UAG in the last 72 hours. CRP 2.1->3.8-> 2.4  D Dimer  1650->865-> 1516  Sed rate 6  Procal .08  Fibrinogen 473     Assessment:    1. Mild to moderate SARS CoV 2 pneumonia   2. Acute small bilateral Pulmonary Embolism -likely provoked from recent SARS CoV 2 infection  3. CAD, Hx CABG  4. HTN  5. Parkinson's   6. Lifelong non smoker  7. Acute delirium       Plan:   1. Wean oxygen as tolerated to keep SpO2 >90%, on 6L NC  2. Out of window for remdesivir  3. Continue vitamins. 4. Decadron 10 mg IV daily started 9/22, Christophe 4 mg started 9/22  5. Speech input reviewed and appreciated after barium swallow study- nectar thick liquids with bite-size solids recommended  6. Inflammatory markers stable, continue to monitor  7. CXR reviewed today, worsening LLL infiltrates  8. Add Albuterol QID  9.  Unasyn per PCP for aspiration PNA  10. Palliative medicine following, code status changed to DNR-CCA  11. Tele-sitter remains at bedside for now  12. Eliquis with loading dose 10 mg po bid x 7 days then decrease to 5 mg po bid. Recommend tx for 3 months. 13. Echo ordered- not completed at this time. No evidence of RV strain on CT chest. Small non occlusive filling defects. 14. Incentive spirometer   15. GI prophylaxis-protonix  16. PT/OT   17. Patient will need SNF upon discharge       This plan of care was reviewed in collaboration with Dr. Cristina Castro  Electronically signed by RAJAT Sharma CNP on 9/24/2021 at 3:25 PM       I personally saw, examined and provided care for the patient. Radiographs, labs and medication list were reviewed by me independently. I spoke with bedside nursing, therapists and consultants. The case was discussed in detail and plans for care were established. Review of CNP documentation was conducted and revisions were made as appropriate. I agree with the above documented exam, problem list and plan of care.   Christina Whelan MD

## 2021-09-24 NOTE — PROGRESS NOTES
Physical Therapy  Facility/Department: [address] INTERMEDIATE 1  Daily Treatment Note  NAME: Daniel Barraza  : 1940  MRN: [id number]    Date of Service: 2021    Patient Diagnosis(es): The primary encounter diagnosis was Pneumonia due to COVID-19 virus. Diagnoses of Acute respiratory failure with hypoxia (HCC) and Bilateral pulmonary embolism ([address] were also pertinent to this visit. has a past medical history of CAD (coronary artery disease), Hyperlipidemia, and Hypertension. has a past surgical history that includes Coronary artery bypass graft and Tonsillectomy. Evaluating Therapist: Timoteo Morris PT        Room #:  5962/5578-Y  Diagnosis:  Bilateral pulmonary embolism ([address] [I26.99]  Acute respiratory failure with hypoxia ([address] [J96.01]  Pneumonia due to COVID-19 virus [U07.1, J12.82]  PMHx/PSHx:  CAD, HTN  Precautions:  Falls, O2, droplet plus isolation, TSM        Social:  Pt lives with wife in a 2 floor plan but stays first floor. Independent without device.         Initial Evaluation  Date: 21 Treatment     21 Short Term/ Long Term   Goals   Was pt agreeable to Eval/treatment? yes  yes     Does pt have pain?  No c/o pain No c/o pain      Bed Mobility  Rolling: min assist  Supine to sit: min assist  Sit to supine: NT  Scooting: min assist  supine to sit mod assist  Scooting mod assist  Sit to supine mod assist SBA   Transfers Sit to stand: min assist  Stand to sit: min assist  Stand pivot: min assist  sit to stand min assist  Stand to sit min assist SBA   Ambulation    30 feet with ww min assist  15 feet with ww min assist 60 feet with ww SBA   Stair Negotiation  Ascended and descended  NT       LE strength     3+/5     4-/5   balance      Fair with ww       AM-PAC Raw score                       Patient education  Pt was educated on transfer technique    Patient response to education:   Pt verbalized understanding Pt demonstrated skill Pt requires further education in this area   no With cues and assist yes     Additional Comments/treatment: Pt instructed in safety with transfers and walker safety. Fatigues quickly with mobility, increased rest time needed. Decreased step length and foot clearance with ambulation. Pt was left in bed with call light left by patient. Chair/bed alarm: yes    Time in: 1010  Time out: 1025    Total treatment time 15 minutes    Pt is making good progress toward established Physical Therapy goals. Continue with physical therapy current plan of care.     Elizabeth PT [id number]      CPT codes:  [] Low Complexity PT evaluation 40608  [] Moderate Complexity PT evaluation 75498  [] High Complexity PT evaluation 27712  [] PT Re-evaluation 22530  [] Gait training 12017  minutes  [] Manual therapy 16217    minutes  [x] Therapeutic activities 96876    minutes  [] Therapeutic exercises 90825     minutes  [] Neuromuscular reeducation 84223     minutes

## 2021-09-24 NOTE — CARE COORDINATION
Social Work/Discharge Planning:  Sangeeta with [address] [place] [address] facility has no beds available. Stacey Salomon with [facility] has a bed available. [facility]ed for [facility] to start pre-cert. Patient will need a HENS. Provided update to patient wife Jag Murguia (ph: [phone]). Will continue to follow and wait for pre-cert.   Electronically signed by CLEVELAND Márquez on 9/24/2021 at 3:57 PM

## 2021-09-24 NOTE — PROGRESS NOTES
Zarina Bowman, DO   650 mg at 09/23/21 2026    Or    acetaminophen (TYLENOL) suppository 650 mg  650 mg Rectal Q6H PRN Chaz Bowman, DO        guaiFENesin-dextromethorphan (ROBITUSSIN DM) 100-10 MG/5ML syrup 5 mL  5 mL Oral Q4H PRN Chaz Luaino, DO        Vitamin D (CHOLECALCIFEROL) tablet 2,000 Units  2,000 Units Oral Daily Chaz Luaino, DO   2,000 Units at 09/23/21 6351    sodium chloride flush 0.9 % injection 10 mL  10 mL IntraVENous 2 times per day Chaz Luaino, DO   10 mL at 09/23/21 2027    sodium chloride flush 0.9 % injection 10 mL  10 mL IntraVENous PRN Chaz Luaino, DO        0.9 % sodium chloride infusion  25 mL IntraVENous PRN Chaz Bowman, DO        potassium chloride (KLOR-CON M) extended release tablet 40 mEq  40 mEq Oral PRN Chaz Bowman, DO        Or    potassium bicarb-citric acid (EFFER-K) effervescent tablet 40 mEq  40 mEq Oral PRN Chaz Bowman, DO        Or    potassium chloride 10 mEq/100 mL IVPB (Peripheral Line)  10 mEq IntraVENous PRN Chaz Luaino, DO        ondansetron (ZOFRAN-ODT) disintegrating tablet 4 mg  4 mg Oral Q8H PRN Chaz Bowman, DO        Or    ondansetron (ZOFRAN) injection 4 mg  4 mg IntraVENous Q6H PRN Chaz Luaino, DO        senna (SENOKOT) tablet 8.6 mg  1 tablet Oral Daily PRN Chaz Bowman, DO        perflutren lipid microspheres (DEFINITY) injection 1.65 mg  1.5 mL IntraVENous ONCE PRN Chaz Luaino, DO        zinc sulfate (ZINCATE) capsule 50 mg  50 mg Oral Daily Chaz Bowman, DO   50 mg at 09/23/21 0916    thiamine tablet 100 mg  100 mg Oral Daily Chaz Luaino, DO   100 mg at 09/23/21 7934    aspirin chewable tablet 81 mg  81 mg Oral Nightly Chaz Luaino, DO   81 mg at 09/23/21 2026    metoprolol tartrate (LOPRESSOR) tablet 25 mg  25 mg Oral BID Chaz Bowman, DO   25 mg at 09/23/21 2026    rosuvastatin (CRESTOR) tablet 20 mg  20 mg Oral Nightly Chaz Bowman DO   20 mg at 09/23/21 2026    apixaban (ELIQUIS) tablet 10 mg  10 mg Oral BID Anselmo Macias MD   10 mg at 09/23/21 2026    Followed by   Vale Stout ON 9/28/2021] apixaban (ELIQUIS) tablet 5 mg  5 mg Oral BID Anselmo Macias MD        pantoprazole (PROTONIX) tablet 40 mg  40 mg Oral QAM AC Brian Cardenas, APRSARAHI - CNP   40 mg at 09/24/21 0618    heparin (porcine) injection 7,980 Units  80 Units/kg IntraVENous PRN Chaz E Volino, DO        heparin (porcine) injection 3,990 Units  40 Units/kg IntraVENous PRN Chaz E Volino, DO           PRN Medications  glucose, dextrose, glucagon (rDNA), dextrose, acetaminophen **OR** acetaminophen, guaiFENesin-dextromethorphan, sodium chloride flush, sodium chloride, potassium chloride **OR** potassium alternative oral replacement **OR** potassium chloride, ondansetron **OR** ondansetron, senna, perflutren lipid microspheres, heparin (porcine), heparin (porcine)    Objective  Most Recent Recorded Vitals  BP (!) 144/82   Pulse 57   Temp 97.6 °F (36.4 °C) (Axillary)   Resp 24   Ht 5' 6\" (1.676 m)   Wt 219 lb 1.6 oz (99.4 kg)   SpO2 (!) 89%   BMI 35.36 kg/m²   I/O last 3 completed shifts: In: 360 [P.O.:360]  Out: 650 [Urine:650]  No intake/output data recorded.     Physical Exam:  General: AAO to person/place/time/purpose, NAD, no labored breath, appears very weak ing  Eyes: conjunctivae/corneas clear, sclera non icteric  Skin: color/texture/turgor normal, no rashes or lesions  Lungs: diminished but CTAB, no retractions/use of accessory muscles, no vocal fremitus, no rhonchi, no crackle, no rales  Heart: regular rate, regular rhythm, no murmur  Abdomen: soft, NT, bowel sounds normal  Extremities: atraumatic, no edema  Neurologic: cranial nerves 2-12 grossly intact, no slurred speech    Most Recent Labs  Lab Results   Component Value Date    WBC 6.1 09/24/2021    HGB 13.7 09/24/2021    HCT 41.5 09/24/2021     09/24/2021     09/24/2021    K 4.4 09/24/2021     09/24/2021    CREATININE 0.8 09/24/2021    BUN 19 09/24/2021    CO2 22 09/24/2021    GLUCOSE 187 (H) 09/24/2021    ALT 31 09/24/2021    AST 35 09/24/2021    INR 1.6 09/24/2021       XR CHEST PORTABLE   Final Result   The bibasilar atelectasis worse on the right         FL MODIFIED BARIUM SWALLOW W VIDEO   Final Result   Deep penetration to thin consistencies of barium contrast but no aspiration. Please see separate speech pathology report for full discussion of findings   and recommendations. XR CHEST PORTABLE   Final Result   Redemonstration of findings suggestive of bilateral pneumonia increasing in   mid left lung field and left lung base. US DUP LOWER EXTREMITIES BILATERAL VENOUS   Final Result   Nonocclusive DVT extending from the right distal common femoral to proximal   superficial femoral vein. Critical results were called by Dr. Medina Wolf to ERICA Fox on   9/21/2021 at 20:04. CTA PULMONARY W CONTRAST   Final Result   Multifocal pulmonary artery filling defects most compatible with bilateral   PE. No definite CT evidence of right heart strain. Multifocal bilateral pulmonary infiltrates may correspond with history of   COVID pneumonia      Partially imaged simple appearing cyst in the abdomen may be exophytic from   the right kidney anteriorly      Critical results were called by Dr. Gio Tinajero to Dr. Johanne Diaz  on   9/20/2021 at 4:37 p.m. Eastern         XR CHEST PORTABLE   Final Result   Mild-moderate bibasilar consolidation, favor atelectasis over pneumonia.          XR CHEST PORTABLE    (Results Pending)       Echocardiogram 9/21/21  Pending report     Assessment   Active Hospital Problems    Diagnosis     Pneumonia due to COVID-19 virus [U07.1, J12.82]      Priority: High    Dehydration [E86.0]      Priority: Medium    Pulmonary embolism, bilateral (HCC) [I26.99]      Priority: Medium    Hyperlipidemia [E78.5]     Hypertension [I10]     CAD (coronary artery disease) [I25.10]     Acute respiratory failure with hypoxia ([facility] [J96.01]          Plan  · COVID-19 viral pneumonia w/ associated sepsis:   · Contact/droplet isolation. · Vaccinated with J&J March 2020  · Out of window for Remdesivir. · Decadron 10mg IV QD started 9/22  · Baricitinib started 9/22   · Vitamin protocol  · Follow inflammatory markers  · Wean NCO2 as able  · PE: CTA chest noted. Eliquis. Echo pending report. Pulmonology following  · Aspiration PNA concern   · Video swallow completed   · Speech cleared for PO diet   · CXR worsening on L side - reviewed   · Start Unasyn 9/23   · Dehydration: Monitor fluid status  · Hypothermia: Bear hugger prn  · Parkinson's disease: OP neurology f/u  · Weakness: PT AM PAC 16/24  · Follow labs  · DVT prophylaxis  · Please see orders for further management and care. ·  for discharge planning  · Discharge plan: [facility] w/ [facility] vs SNF     Electronically signed by Celia Wood MD on 9/24/2021 at 7:49 AM    I can be reached through [address] [place] [address].

## 2021-09-24 NOTE — PROGRESS NOTES
Occupational Therapy  OT BEDSIDE TREATMENT NOTE      Date:2021  Patient Name: Per Fernández  MRN: [id number]  : 1940  Room: [address] [place] [address]     Evaluating OT: Rebeca Rodgers OTR/L   [id number]       Referring Provider:Chaz Bowman DO    Specific Provider Orders/Date:OT eval and treat 2021        Diagnosis: Bilateral pulmonary embolism  COVID 19      Pertinent Medical History: HTN, CAD,     Precautions:  Fall Risk, DROPLET PLUS, O2       Assessment of current deficits    [x]? Functional mobility            [x]?ADLs           [x]? Strength                  []?Cognition    [x]? Functional transfers          [x]? IADLs         [x]? Safety Awareness   [x]? Endurance    []? Fine Coordination                         [x]? Balance      []? Vision/perception   []? Sensation      []? Gross Motor Coordination             []? ROM           []? Delirium                   []? Motor Control      OT PLAN OF CARE   OT POC based on physician orders, patient diagnosis and results of clinical assessment     Frequency/Duration  2-3 days/wk for 2 weeks PRN   Specific OT Treatment Interventions to include:   ADL retraining/adapted techniques and AE recommendations to increase functional independence within precautions                    Energy conservation techniques to improve tolerance for selfcare routine   Functional transfer/mobility training/DME recommendations for increased independence, safety and fall prevention         Patient/family education to increase safety and functional independence             Environmental modifications for safe mobility and completion of ADLs                             Therapeutic activity to improve functional performance during ADLs.                                         Therapeutic exercise to improve tolerance and functional strength for ADLs    Balance retraining/tolerance tasks for facilitation of postural control with dynamic challenges during ADLs .       Positioning to improve functional independence        Recommended Adaptive Equipment: TBD      Home Living: Pt lives with wife, 2 story with 1st floor set-up.   Steps to enter       Prior Level of Function: assist as needed  with ADLs , assist  with IADLs; ambulated with no device         Pain Level: no pain ;   Cognition: A&O: following commads, pleasant and conversing               Memory:  Fair +               Sequencing:  Fair+              Problem solving:  Fair               Judgement/safety:  Fair                Functional Assessment:  AM-PAC Daily Activity Raw Score: 14/24    Initial Eval Status  Date: 9/21/21 Treatment Status  Date:9/24/2021 STGs = LTGs  Time frame: 10-14 days   Feeding Independent        Grooming SBA/set-up,seated  Decrease standing tolerance/balance   Mod I    UB Dressing Min A Mod A  Don/doff hospital gown   Mod I    LB Dressing Mod A  Assist threading pants over feet and pulling up over hips  Max A  Total assist with donning socks  SBA    Bathing Mod A    SBA    Toileting Assist with thorough hygiene  Incontinence - assist with hygiene   Supervision    Bed Mobility  Min A  Supine to sit   (increase time)  Mod A  Supine <> sit Supervision    Functional Transfers Min A  sist-stand from bed   Shiva   Sit-stand from bed Mod i   Functional Mobility Min A,wlwalker   Shot distance in room   On room air O2 sats mid/high 80s     Nursing present in room, 2 L o2 applied - sats improved 90-91%  Min A,w/walker, O2  Short distance in room   SOB with activity   Patient encourage deep breathing tech  Supervision  with good tolerance    Balance Sitting:     Static:  SBA- EOB     Dynamic:Min A  Standing: Shiva  Sitting EOB - SBA   Standing - Min A  Supervision    Activity Tolerance Fair with light activity   No complaints of SOB  Fair - with light activity   Good  with ADL activity    Visual/  Perceptual Glasses: none by bedside       UE AROM WFLS for ADL activity    Education:  Safety awareness, deep breathing tech

## 2021-09-25 NOTE — PROGRESS NOTES
Patient wife Nikky Falcon notified of patient events this morning and updated of bilateral wrist restraints due to patient removing high flow oxygen.

## 2021-09-25 NOTE — PROGRESS NOTES
Spoke to patient's daughter and she is okay with going with [facility] for hospice care. Daughter's goal would for patient to be transferred to hospice house. Daughter updated on plan of care.

## 2021-09-25 NOTE — DISCHARGE SUMMARY
Discharge Summary     Patient ID:  Mesha Caicedo  [id number]  78 y.o. 1940 male  No primary care provider on file. Admit date: 9/20/2021    Discharge date and time:  9/25/2021 1700 pm    Activity level: Bedrest  Diet: For pleasure  Labs:None  Disposition:Hospice house  Condition on Discharge:Terminal   DME: None     Admit Diagnoses:   Patient Active Problem List   Diagnosis    Acute respiratory failure with hypoxia ([address]    Pneumonia due to COVID-19 virus    Pulmonary embolism, bilateral ([address]    Hyperlipidemia    Hypertension    Dehydration    CAD (coronary artery disease)       Discharge Diagnoses: Principal Problem:    Pneumonia due to COVID-19 virus  Active Problems:    Pulmonary embolism, bilateral (HCC)    Dehydration    Acute respiratory failure with hypoxia (HCC)    Hyperlipidemia    Hypertension    CAD (coronary artery disease)  Resolved Problems:    * No resolved hospital problems. *  1. Acute Hypoxic respiratory failure   2. COVID-19 pneumonia   3. Bilateral Pulmonary embolism   4. CAD   5. Hypertension      Consults:  IP CONSULT TO SOCIAL WORK  IP CONSULT TO PHARMACY  IP CONSULT TO PHARMACY  IP CONSULT TO PALLIATIVE CARE  IP CONSULT TO SOCIAL WORK  IP CONSULT TO HOSPICE    Procedures: None     Hospital Course: Michael Garcia is a 78y.o. year old male. He has a past medical history of hypertension hyperlipidemia. Says that he has been feeling worse for the past 3 weeks after he was diagnosed with Covid pneumonia, he has had minimal coughing that has been rather nonproductive. Day prior he was experiencing worsening shortness of breath and body aches, when he got emergency department his initial oxygen saturation was 87% on room air. Rest has improved his symptoms and they are exacerbated by activity. He lives at home with his wife and his nephew and does not have difficult time taking care of himself.  Labs emergency department were relatively within normal limits, CMP did not show any acute electrolyte abnormalities and CBC did not show anything either. His PT and PTT were slightly elevated. Chest x-ray shows mild to moderate bibasilar consolidation that was highlighting atelectasis over pneumonia, CTA showed bilateral pulmonary emboli and he was started on heparin in the emergency department. He said he was feeling better Grisel is a 78y.o. year old male. He has a past medical history of hypertension hyperlipidemia. Says that he has been feeling worse for the past 3 weeks after he was diagnosed with Covid pneumonia, he has had minimal coughing that has been rather nonproductive. Yesterday he was experiencing worsening shortness of breath and body aches, when he got emergency department his initial oxygen saturation was 87% on room air. Rest has improved his symptoms and they are exacerbated by activity. He lives at home with his wife and his nephew and does not have difficult time taking care of himself. Labs emergency department were relatively within normal limits, CMP did not show any acute electrolyte abnormalities and CBC did not show anything either. His PT and PTT were slightly elevated. Chest x-ray shows mild to moderate bibasilar consolidation that was highlighting atelectasis over pneumonia, CTA showed bilateral pulmonary emboli and he was started on heparin in the emergency department. He said he was feeling better this morning than he was day prior and has no acute needs. He was not on oxygen at home. Patient was admitted. He was initially off of oxygen. He was started on eliquis. On day two he had hypothermia and required a bear hugger. Patient on day 3 developed ? Aspiration pneumonia. He was seen by speech therapy as RN reported coughing with oral intake. He was started on Unasyn. Palliative medicine was consulted to discuss goals of care. His oxygen requirements went from 2 Liters to 6 liters to 15 liters. He became anxious and kept pulling off his oxygen.  Called and discussed prognosis with Daughter, Chito Bell as well as wife, Jesús Avila. They elected to make the patient comfortable and elected for Hospice consult. Hospice recommended transfer to hospice house. Patient was discharged to Hospice house in terminal condition. Discharge Exam:  General Appearance: Ill-appearing, in no acute distress and comfortable. Vital signs: (most recent): Blood pressure (!) 154/71, pulse 89, temperature 94.1 °F (34.5 °C), temperature source Axillary, resp. rate (!) 32, height 5' 6\" (1.676 m), weight 219 lb 1.6 oz (99.4 kg), SpO2 95 %. Lungs: Normal effort and normal respiratory rate. There are decreased breath sounds. No rales or rhonchi. Heart: Normal rate. Regular rhythm. S1 normal and S2 normal. No friction rub. Abdomen: Abdomen is soft and non-distended. Bowel sounds are normal. There is no abdominal tenderness. There is no rebound tenderness. There is no guarding. Extremities: Normal range of motion. There is no dependent edema. Skin: Warm and dry. No intake/output data recorded. I/O this shift:  In: 100 [IV Piggyback:100]  Out: 150 [Urine:150]      LABS:  Recent Labs     09/23/21  0515 09/24/21  0400 09/25/21  0258    139 143   K 4.4 4.4 4.0   * 107 106   CO2 23 22 24   BUN 18 19 21   CREATININE 0.8 0.8 0.9   GLUCOSE 203* 187* 159*   CALCIUM 7.5* 7.7* 8.1*       Recent Labs     09/23/21  0515 09/24/21  0400 09/25/21  0258   WBC 5.4 6.1 5.2   RBC 4.12 4.56 4.67   HGB 12.6 13.7 14.3   HCT 38.1 41.5 43.4   MCV 92.5 91.0 92.9   MCH 30.6 30.0 30.6   MCHC 33.1 33.0 32.9   RDW 14.6 14.4 14.4    152 178   MPV 9.7 9.6 9.4       Recent Labs     09/25/21  1057   GLUMET 121*         Imaging:  No results found.     Patient Instructions:   Discharge Medication List as of 9/25/2021  4:54 PM      CONTINUE these medications which have NOT CHANGED    Details   metoprolol tartrate (LOPRESSOR) 25 MG tablet Take 25 mg by mouth 2 times daily Brenda Schwab has not taken this medication since Thursday 9/16/21. debo states this is due to hypotension. Stated his diastolic was 24? Historical Med      rosuvastatin (CRESTOR) 20 MG tablet Take 20 mg by mouth nightly Historical Med      aspirin 81 MG chewable tablet Take 81 mg by mouth nightly Historical Med      dexamethasone (DECADRON) 4 MG tablet Take 4 mg by mouth daily (with breakfast) Received 8 tablets and took 1st dose on 9/20/21. Historical Med      Multiple Vitamins-Minerals (PRESERVISION AREDS 2 PO) Take 1 tablet by mouth nightlyHistorical Med               Note that more than 30 minutes was spent in preparing discharge papers, discussing discharge with patient, medication review, etc.      Signed:    Greyson Jones DO    Electronically signed by Greyson Jones DO on 9/25/2021 at 7:23 PM

## 2021-09-25 NOTE — PROGRESS NOTES
Family has requested patient to be DNR comfort care and has asked to see hospice. We will sign off. Please call us with any questions or can be of any assistance.     Latoya Nelson MD

## 2021-09-25 NOTE — PROGRESS NOTES
Patient bed alarm going off. Entered room and patient was standing at bedside with high flow oxygen off. Myself, PSA, and patient RN got patient back to bed and reapplied oxygen. Patient continued to try and remove oxygen, not following commands. Non rebreather applied. Patient kept trying to take off non rebreather and shaking his head to get mask off. Dr. Avery Villatoro paged to update, orders received.

## 2021-09-25 NOTE — PROGRESS NOTES
Updated Daughter on patient's plan of care and update of status. Patient's daughter stated that her and the patient's wife would like the patient to receive comfort care and if it comes to a point would not like CPR or intubation. I will call Dr. Tereso Sever to update and also ask to call and update family as they have been waiting for an update from a physician.

## 2021-09-25 NOTE — PROGRESS NOTES
Progress Note  Date:2021       [facility]:[id number]  Patient Name:Catrachito Ramesh     YOB: 1940     Age:81 y.o. Patient seen for follow up of COVID-19 pneumonia. Patient had worsening oxygen requirement from yesterday. He became increasingly confused today ambulating back and forth to the restroom taking off his oxygen when ambulating. Subjective    Subjective:  Symptoms:  Worsening. He reports shortness of breath and cough. No chest pain, headache, chest pressure, diarrhea or anxiety. Diet:  No nausea or vomiting. Review of Systems   Respiratory: Positive for cough and shortness of breath. Cardiovascular: Negative for chest pain. Gastrointestinal: Negative for diarrhea, nausea and vomiting. Objective         Vitals Last 24 Hours:  TEMPERATURE:  Temp  Av.5 °F (35.3 °C)  Min: 94.1 °F (34.5 °C)  Max: 96.8 °F (36 °C)  RESPIRATIONS RANGE: Resp  Av  Min: 24  Max: 32  PULSE OXIMETRY RANGE: SpO2  Av %  Min: 89 %  Max: 95 %  PULSE RANGE: Pulse  Av  Min: 57  Max: 89  BLOOD PRESSURE RANGE: Systolic (91TWT), UC , Min:154 , ZWH:188   ; Diastolic (75TGS), PAB:73, Min:55, Max:71    I/O (24Hr): No intake or output data in the 24 hours ending 21 1459  Objective:  General Appearance:  Ill-appearing, in no acute distress and comfortable. Vital signs: (most recent): Blood pressure (!) 154/71, pulse 89, temperature 94.1 °F (34.5 °C), temperature source Axillary, resp. rate (!) 32, height 5' 6\" (1.676 m), weight 219 lb 1.6 oz (99.4 kg), SpO2 95 %. Lungs:  Normal effort and normal respiratory rate. There are decreased breath sounds. No rales or rhonchi. Heart: Normal rate. Regular rhythm. S1 normal and S2 normal.  No friction rub. Abdomen: Abdomen is soft and non-distended. Bowel sounds are normal.   There is no abdominal tenderness. There is no rebound tenderness. There is no guarding. Extremities: Normal range of motion.   There is no dependent edema. Skin:  Warm and dry. Labs/Imaging/Diagnostics    Labs:  CBC:Recent Labs     09/23/21  0515 09/24/21  0400 09/25/21  0258   WBC 5.4 6.1 5.2   RBC 4.12 4.56 4.67   HGB 12.6 13.7 14.3   HCT 38.1 41.5 43.4   MCV 92.5 91.0 92.9   RDW 14.6 14.4 14.4    152 178     CHEMISTRIES:  Recent Labs     09/23/21  0515 09/24/21  0400 09/25/21  0258    139 143   K 4.4 4.4 4.0   * 107 106   CO2 23 22 24   BUN 18 19 21   CREATININE 0.8 0.8 0.9   GLUCOSE 203* 187* 159*     PT/INR:  Recent Labs     09/23/21  0515 09/24/21  0400 09/25/21  0258   PROTIME 19.4* 17.7* 22.3*   INR 1.8 1.6 2.0     APTT:No results for input(s): APTT in the last 72 hours. LIVER PROFILE:  Recent Labs     09/23/21  0515 09/24/21  0400 09/25/21  0258   AST 33 35 40*   ALT 28 31 36   BILITOT 0.6 0.8 0.9   ALKPHOS 58 64 70       Imaging Last 24 Hours:  XR CHEST PORTABLE    Result Date: 9/24/2021  EXAMINATION: ONE XRAY VIEW OF THE CHEST 9/24/2021 12:53 pm COMPARISON: None. HISTORY: ORDERING SYSTEM PROVIDED HISTORY: pna TECHNOLOGIST PROVIDED HISTORY: Reason for exam:->pna FINDINGS: There is a small vague infiltrate seen within the right lung base and there are vague infiltrate seen within the left lower lobe. In comparison with the patient's prior study of 1 day earlier the infiltrates are more prominent within the left lower lobe. The cardiac silhouette is within normal limits. Postoperative sternotomy changes are noted. 1. Worsening of the left lower lobe airspace disease when compared to the prior study 2. Small patchy infiltrate within the right lower lobe.      Assessment//Plan           Hospital Problems         Last Modified POA    * (Principal) Pneumonia due to COVID-19 virus 9/20/2021 Yes    Pulmonary embolism, bilateral ([address] 9/20/2021 Yes    Dehydration 9/21/2021 Yes    Acute respiratory failure with hypoxia ([address] 9/20/2021 Yes    Hyperlipidemia 9/21/2021 Yes    Hypertension 9/21/2021 Yes    CAD (coronary artery disease) 9/21/2021 Yes        Assessment & Plan  1. Acute Hypoxic respiratory failure  2. COVID-19 pneumonia  3. Bilateral Pulmonary embolism  4. CAD  5. Hypertension    Patient up to 15 liters of oxygen. Required restraints as ripping off oxygen and ambulating without it. Called daughter Emmy Ritchie and wife Nikky Falcon at numbers listed in chart. Explained his worsening condition and poor prognosis at this point. They both agreed that they believe he would like to be kept comfortable and wife would like to be with him. They have elected for hospice consultation and transfer to hospice house to be with him. Called nursing, orders placed. Will place order for morphine for comfort to help with air hunger.      Shaila Koch, DO

## 2021-09-25 NOTE — PROGRESS NOTES
Notified Dr. Emilie Kothari of patient increasingly anxious and continuing to take off high flow oxygen and getting out of bed. Orders received.

## 2021-09-25 NOTE — PROGRESS NOTES
Paged Dr. Valdez Delay regarding patient to be discharged to [facility] today at [address] [facility].
